# Patient Record
Sex: FEMALE
[De-identification: names, ages, dates, MRNs, and addresses within clinical notes are randomized per-mention and may not be internally consistent; named-entity substitution may affect disease eponyms.]

---

## 2017-06-22 ENCOUNTER — HOSPITAL ENCOUNTER (EMERGENCY)
Dept: HOSPITAL 31 - C.ER | Age: 52
Discharge: HOME | End: 2017-06-22
Payer: COMMERCIAL

## 2017-06-22 VITALS
TEMPERATURE: 98.1 F | HEART RATE: 88 BPM | DIASTOLIC BLOOD PRESSURE: 81 MMHG | SYSTOLIC BLOOD PRESSURE: 112 MMHG | RESPIRATION RATE: 18 BRPM

## 2017-06-22 VITALS — OXYGEN SATURATION: 98 %

## 2017-06-22 DIAGNOSIS — F32.9: Primary | ICD-10-CM

## 2017-06-22 NOTE — C.PDOC
History Of Present Illness


51 y/o female presents to ED with complaints of feeling depressed. Patient 

states for 1 month her  and her were  for infidelity and has 

now developed depressio, "bad thoughts",and loss of appetite. Patient says she 

thought she could overcome the depression by herself as she has overcame 

depression before but she couldn't and came to ED for help. Patient denies 

Suicidal ideation or Homicidal ideation, hallucinations or any other complaints 

at this time. 


Time Seen by Provider: 06/22/17 12:41


Chief Complaint (Nursing): Psychiatric Evaluation


History Per: Patient


History/Exam Limitations: no limitations


Onset/Duration Of Symptoms: Days


Current Symptoms Are (Timing): Still Present


Suicide/Self Injury Attempted (Context): None


Modifying Factor(s): None


Associated Symptoms: Depression





Past Medical History


Reviewed: Historical Data, Nursing Documentation, Vital Signs


Vital Signs: 


 Last Vital Signs











Temp  98.3 F   06/22/17 11:56


 


Pulse  109 H  06/22/17 11:56


 


Resp  20   06/22/17 11:56


 


BP  106/73   06/22/17 11:56


 


Pulse Ox  98   06/22/17 13:26














- Medical History


PMH: Hypercholesterolemia


Family History: States: No Known Family Hx





- Social History


Hx Alcohol Use: No


Hx Substance Use: No





- Immunization History


Hx Tetanus Toxoid Vaccination: No


Hx Influenza Vaccination: No





Review Of Systems


Except As Marked, All Systems Reviewed And Found Negative.


Constitutional: Negative for: Fever


Respiratory: Negative for: Shortness of Breath


Gastrointestinal: Negative for: Nausea, Vomiting, Diarrhea


Neurological: Negative for: Weakness, Headache


Psych: Positive for: Depression.  Negative for: Suicidal ideation





Physical Exam





- Physical Exam


Appears: Non-toxic, Other (Emotional)


Skin: Normal Color, Warm


Head: Atraumatic, Normacephalic


Oral Mucosa: Moist


Extremity: Normal ROM, Capillary Refill (<2 seconds)


Neurological/Psych: Oriented x3, Normal Speech, Other (Depressed )





ED Course And Treatment


O2 Sat by Pulse Oximetry: 98 (RA)


Pulse Ox Interpretation: Normal





Medical Decision Making


Medical Decision Making: 


Psych department called for evaluation on patient








Disposition





- Disposition


Referrals: 


[facility] [Outside]


Disposition: HOME/ ROUTINE


Disposition Time: 14:53


Condition: FAIR


Additional Instructions: 


Ms. Celaya, thank you for letting us take care of you today.





Return to the ER if your symptoms worsen, or if any problems.





Our Mental Health coordinator recommends that you follow up at the [facility].  

Please follow the instructions she gave to you today.


Forms:  Gen Discharge Inst Hungarian, [facility] (Hungarian)


Print Language: Czech





- POA


Present On Arrival: None





- Clinical Impression


Clinical Impression: 


 Major depressive disorder, single episode, unspecified








- Scribe Statement


The provider has reviewed the documentation as recorded by the Norbertibchante Mcghee





All medical record entries made by the Norbertibchante were at my direction and 

personally dictated by me. I have reviewed the chart and agree that the record 

accurately reflects my personal performance of the history, physical exam, 

medical decision making, and the department course for this patient. I have 

also personally directed, reviewed, and agree with the discharge instructions 

and disposition.

## 2018-01-16 ENCOUNTER — HOSPITAL ENCOUNTER (EMERGENCY)
Dept: HOSPITAL 31 - C.ER | Age: 53
Discharge: HOME | End: 2018-01-16
Payer: COMMERCIAL

## 2018-01-16 VITALS
TEMPERATURE: 98.1 F | SYSTOLIC BLOOD PRESSURE: 122 MMHG | HEART RATE: 68 BPM | DIASTOLIC BLOOD PRESSURE: 72 MMHG | RESPIRATION RATE: 20 BRPM

## 2018-01-16 VITALS — OXYGEN SATURATION: 99 %

## 2018-01-16 VITALS — BODY MASS INDEX: 28.3 KG/M2

## 2018-01-16 DIAGNOSIS — E78.00: ICD-10-CM

## 2018-01-16 DIAGNOSIS — K59.00: Primary | ICD-10-CM

## 2018-01-16 LAB
ALBUMIN SERPL-MCNC: 4.1 G/DL (ref 3.5–5)
ALBUMIN/GLOB SERPL: 1.1 {RATIO} (ref 1–2.1)
ALT SERPL-CCNC: 21 U/L (ref 9–52)
AST SERPL-CCNC: 24 U/L (ref 14–36)
BASOPHILS # BLD AUTO: 0.1 K/UL (ref 0–0.2)
BASOPHILS NFR BLD: 0.4 % (ref 0–2)
BILIRUB UR-MCNC: NEGATIVE MG/DL
BNP SERPL-MCNC: 162 PG/ML (ref 0–900)
BUN SERPL-MCNC: 10 MG/DL (ref 7–17)
CALCIUM SERPL-MCNC: 9 MG/DL (ref 8.6–10.4)
EOSINOPHIL # BLD AUTO: 0 K/UL (ref 0–0.7)
EOSINOPHIL NFR BLD: 0 % (ref 0–4)
ERYTHROCYTE [DISTWIDTH] IN BLOOD BY AUTOMATED COUNT: 13.3 % (ref 11.5–14.5)
GFR NON-AFRICAN AMERICAN: > 60
GLUCOSE UR STRIP-MCNC: NORMAL MG/DL
HGB BLD-MCNC: 12.1 G/DL (ref 11–16)
LEUKOCYTE ESTERASE UR-ACNC: (no result) LEU/UL
LIPASE: 110 U/L (ref 23–300)
LYMPHOCYTE: 6 % (ref 20–40)
LYMPHOCYTES # BLD AUTO: 1.5 K/UL (ref 1–4.3)
LYMPHOCYTES NFR BLD AUTO: 8.4 % (ref 20–40)
MCH RBC QN AUTO: 26.6 PG (ref 27–31)
MCHC RBC AUTO-ENTMCNC: 33.6 G/DL (ref 33–37)
MCV RBC AUTO: 79.4 FL (ref 81–99)
MONOCYTE: 2 % (ref 0–10)
MONOCYTES # BLD: 0.8 K/UL (ref 0–0.8)
MONOCYTES NFR BLD: 4.4 % (ref 0–10)
NEUTROPHILS # BLD: 15.7 K/UL (ref 1.8–7)
NEUTROPHILS NFR BLD AUTO: 86.8 % (ref 50–75)
NEUTROPHILS NFR BLD AUTO: 90 % (ref 50–75)
NEUTS BAND NFR BLD: 2 % (ref 0–2)
NRBC BLD AUTO-RTO: 0 % (ref 0–2)
PH UR STRIP: 8 [PH] (ref 5–8)
PLATELET # BLD EST: (no result) 10*3/UL
PLATELET # BLD: 447 K/UL (ref 130–400)
PMV BLD AUTO: 8.2 FL (ref 7.2–11.7)
PROT UR STRIP-MCNC: (no result) MG/DL
RBC # BLD AUTO: 4.54 MIL/UL (ref 3.8–5.2)
RBC # UR STRIP: NEGATIVE /UL
SP GR UR STRIP: 1.03 (ref 1–1.03)
SQUAMOUS EPITHIAL: 6 /HPF (ref 0–5)
STOMATOCYTES BLD QL SMEAR: SLIGHT
TOTAL CELLS COUNTED BLD: 100
URINE NITRATE: NEGATIVE
UROBILINOGEN UR-MCNC: NORMAL MG/DL (ref 0.2–1)
WBC # BLD AUTO: 18.1 K/UL (ref 4.8–10.8)

## 2018-01-16 PROCEDURE — 85025 COMPLETE CBC W/AUTO DIFF WBC: CPT

## 2018-01-16 PROCEDURE — 74176 CT ABD & PELVIS W/O CONTRAST: CPT

## 2018-01-16 PROCEDURE — 99284 EMERGENCY DEPT VISIT MOD MDM: CPT

## 2018-01-16 PROCEDURE — 74022 RADEX COMPL AQT ABD SERIES: CPT

## 2018-01-16 PROCEDURE — 83880 ASSAY OF NATRIURETIC PEPTIDE: CPT

## 2018-01-16 PROCEDURE — 96374 THER/PROPH/DIAG INJ IV PUSH: CPT

## 2018-01-16 PROCEDURE — 83690 ASSAY OF LIPASE: CPT

## 2018-01-16 PROCEDURE — 80053 COMPREHEN METABOLIC PANEL: CPT

## 2018-01-16 PROCEDURE — 81001 URINALYSIS AUTO W/SCOPE: CPT

## 2018-01-16 PROCEDURE — 84484 ASSAY OF TROPONIN QUANT: CPT

## 2018-01-16 PROCEDURE — 96375 TX/PRO/DX INJ NEW DRUG ADDON: CPT

## 2018-01-16 PROCEDURE — 96361 HYDRATE IV INFUSION ADD-ON: CPT

## 2018-01-16 NOTE — RAD
PROCEDURE:  Radiographs of the chest and abdomen (obstructive series)



HISTORY:

Abdominal pain



COMPARISON:

No prior.



TECHNIQUE:

AP radiograph of the chest, with upright and supine radiographs of 

the abdomen.



FINDINGS:



CHEST:

Lungs: The lungs are clear.



Cardiovascular: Normal size heart. No pulmonary vascular congestion.



Pleura: No pleural fluid. No pneumothorax.



Other findings: None.



ABDOMEN AND PELVIS:

Bowel: There is moderate amount of stool in the ascending and 

proximal transverse colon. No evidence of mechanical obstruction.



Free air: None.



Bones:  There is levoscoliosis in the lumbar spine.



Other findings: None.



IMPRESSION:

Moderate stool burden in the ascending and proximal transverse colon. 

No evidence of bowel obstruction.



Clear lungs.

## 2018-01-16 NOTE — CT
PROCEDURE:  CT Abdomen and Pelvis without intravenous contrast



HISTORY:

abd pain



COMPARISON:

None.



TECHNIQUE:

Technique. 



Contrast Dose: None



Radiation dose:



Total exam DLP = 1045 mGy-cm.



This CT exam was performed using one or more of the following dose 

reduction techniques: Automated exposure control, adjustment of the 

mA and/or kV according to patient size, and/or use of iterative 

reconstruction technique.



FINDINGS:



LOWER THORAX:

Left basal calcified granuloma. Partially visualized coronary artery 

calcifications 



LIVER:

Unremarkable. No gross lesion or ductal dilatation.  



GALLBLADDER AND BILE DUCTS:

Distended gallbladder. No gross gallstones. Common bile duct a 

pancreatic head appears prominent. 



PANCREAS:

Common bile duct prominence - pancreatic head.



SPLEEN:

Unremarkable. 



ADRENALS:

Unremarkable. No mass. 



KIDNEYS AND URETERS:

5 to 6 mm left lower renal pole calculus nonobstructing. No 

hydronephrosis. No gross masses noted on this noncontrast study. 



VASCULATURE:

Short segmental atherosclerotic vascular ossifications -descending 

abdominal aorta. . No aortic aneurysm. 



BOWEL:

Unremarkable. No obstruction. No gross mural thickening. 



APPENDIX:

Unremarkable. Normal appendix. 



PERITONEUM:

Unremarkable. No free fluid. No free air. 



LYMPH NODES:

Unremarkable. No enlarged lymph nodes. 



BLADDER:

Unremarkable. 



REPRODUCTIVE:

Unremarkable. 



BONES:

Developmental variation -inferior lumbar block vertebrae -sacral 

cysts digested 



OTHER FINDINGS:

None.



IMPRESSION:

Left lower renal pole nonobstructing 5 to 6 mm calculus. No 

hydronephrosis no hydroureter 



Distended gallbladder. Common bile duct prominence pancreatic head. 

Evaluation regarding any potential pancreatic masses -no gross 

appreciated. No gross calculi in common bile duct appreciated. 

Follow-up recommended -initial right upper quadrant ultrasound 

attention to gallbladder common bile duct and pancreatic head 

recommended. Additional CT pancreatic protocol subsequent imaging -to 

be determined

## 2018-01-16 NOTE — C.PDOC
History Of Present Illness


52 year old female with PMHx of lumbar scoliosis presents to the ED accompanied 

by her daughter for evaluation of abdominal pain that started today at 

approximately at 03:00 am. Patient states having multiple episodes of vomiting 

associated with chills, subjective fever. Patient denies back pain, weakness, 

numbness, diarrhea, constipation, cough, chills, fever. 


Time Seen by Provider: 01/16/18 12:12


Chief Complaint (Nursing): Abdominal Pain


History Per: Patient, Family


History/Exam Limitations: no limitations


Onset/Duration Of Symptoms: Hrs


Current Symptoms Are (Timing): Still Present


Severity: Moderate


Location Of Pain/Discomfort: Diffuse


Radiation Of Pain To:: None


Quality Of Discomfort: Other (Colic)


Associated Symptoms: Vomiting.  denies: Fever, Diarrhea, Back Pain, Constipation


Exacerbating Factors: None


Alleviating Factors: None


Recent travel outside of the [place]: No


Additional History Per: Patient


Abnormal Vaginal Bleeding: No





Past Medical History


Reviewed: Historical Data, Nursing Documentation, Vital Signs


Vital Signs: 


 Last Vital Signs











Temp  98.1 F   01/16/18 15:09


 


Pulse  68   01/16/18 15:09


 


Resp  20   01/16/18 15:09


 


BP  122/72   01/16/18 15:09


 


Pulse Ox  99   01/16/18 15:42














- Medical History


PMH: Hypercholesterolemia


   Denies: Diabetes, Hepatitis, HIV, HTN, Seizures, Sexually Transmitted Disease


Surgical History: No Surg Hx


Family History: States: Unknown Family Hx





- Social History


Hx Alcohol Use: No


Hx Substance Use: No





- Immunization History


Hx Tetanus Toxoid Vaccination: No


Hx Influenza Vaccination: No





Review Of Systems


Constitutional: Negative for: Fever, Chills


Respiratory: Negative for: Cough, Shortness of Breath


Gastrointestinal: Positive for: Vomiting, Abdominal Pain.  Negative for: Nausea

, Diarrhea, Constipation


Genitourinary: Negative for: Dysuria, Hematuria


Skin: Negative for: Rash


Neurological: Negative for: Weakness, Numbness, Headache





Physical Exam





- Physical Exam


Appears: Non-toxic, In Acute Distress


Skin: Normal Color, Warm, Dry


Head: Atraumatic, Normacephalic


Eye(s): bilateral: Normal Inspection


Nose: No Discharge, No Deformity


Oral Mucosa: Moist


Neck: Normal ROM, Supple


Chest: Symmetrical


Cardiovascular: Rhythm Regular, No Murmur


Respiratory: Normal Breath Sounds, No Rales, No Rhonchi, No Wheezing


Gastrointestinal/Abdominal: Soft, Tenderness, Guarding, No Rebound


Extremity: Normal ROM, No Pedal Edema, No Calf Tenderness, No Deformity, No 

Swelling


Neurological/Psych: Oriented x3, Normal Speech, Normal Cognition





ED Course And Treatment





- Laboratory Results


Result Diagrams: 


 01/16/18 12:36





 01/16/18 12:36


O2 Sat by Pulse Oximetry: 99 (On RA)


Pulse Ox Interpretation: Normal





- Other Rad


  ** Obstructive series X-Ray


X-Ray: Viewed By Me, Read By Radiologist


Interpretation: PROCEDURE:  Radiographs of the chest and abdomen (obstructive 

series).  HISTORY:  Abdominal pain.  COMPARISON:  No prior.  TECHNIQUE:  AP 

radiograph of the chest, with upright and supine radiographs of the abdomen.  

FINDINGS:  CHEST:  Lungs: The lungs are clear.  Cardiovascular: Normal size 

heart. No pulmonary vascular congestion.  Pleura: No pleural fluid. No 

pneumothorax.  Other findings: None.  ABDOMEN AND PELVIS:  Bowel: There is 

moderate amount of stool in the ascending and proximal transverse colon. No 

evidence of mechanical obstruction.  Free air: None.  Bones:  There is 

levoscoliosis in the lumbar spine.  Other findings: None.  IMPRESSION:  

Moderate stool burden in the ascending and proximal transverse colon. No 

evidence of bowel obstruction.  Clear lungs.





- CT Scan/US


  ** Abd/pelvis CT


Other Rad Studies (CT/US): Read By Radiologist, Radiology Report Reviewed


CT/US Interpretation: PROCEDURE:  CT Abdomen and Pelvis without intravenous 

contrast.  HISTORY:  abd pain.  COMPARISON:  None.  TECHNIQUE:  Technique.  

Contrast Dose: None.  Radiation dose:  Total exam DLP = 1045 mGy-cm.  This CT 

exam was performed using one or more of the following dose reduction techniques

: Automated exposure control, adjustment of the mA and/or kV according to 

patient size, and/or use of iterative reconstruction technique.  FINDINGS:  

LOWER THORAX:  Left basal calcified granuloma. Partially visualized coronary 

artery calcifications.  LIVER:  Unremarkable. No gross lesion or ductal 

dilatation.  GALLBLADDER AND BILE DUCTS:  Distended gallbladder. No gross 

gallstones. Common bile duct a pancreatic head appears prominent.  PANCREAS:  

Common bile duct prominence - pancreatic head.  SPLEEN:  Unremarkable.  ADRENALS

:  Unremarkable. No mass.  KIDNEYS AND URETERS:  5 to 6 mm left lower renal 

pole calculus nonobstructing. No hydronephrosis. No gross masses noted on this 

noncontrast study.  VASCULATURE:  Short segmental atherosclerotic vascular 

ossifications -descending abdominal aorta. . No aortic aneurysm.  BOWEL:  

Unremarkable. No obstruction. No gross mural thickening.  APPENDIX:  

Unremarkable. Normal appendix.  PERITONEUM:  Unremarkable. No free fluid. No 

free air.  LYMPH NODES:  Unremarkable. No enlarged lymph nodes.  BLADDER:  

Unremarkable.  REPRODUCTIVE:  Unremarkable.  BONES:  Developmental variation -

inferior lumbar block vertebrae -sacral cysts digested.  OTHER FINDINGS:  None.

  IMPRESSION:  Left lower renal pole nonobstructing 5 to 6 mm calculus. No 

hydronephrosis no hydroureter.  Distended gallbladder. Common bile duct 

prominence pancreatic head. Evaluation regarding any potential pancreatic 

masses -no gross appreciated. No gross calculi in common bile duct appreciated. 

Follow-up recommended -initial right upper quadrant ultrasound attention to 

gallbladder common bile duct and pancreatic head recommended. Additional CT 

pancreatic protocol subsequent imaging -to be determined





Medical Decision Making


Medical Decision Making: 


Impression : Abdominal pain


Plan:


* CT abd/pelvis


* Labs


* Obstructive series X-ray


* UA


* Donnatal 1 yab PO


* Maalox 30 ml PO


* Morphine 2 mg IVP


* Pepcid 20 mg IVP


* IV fluids











Disposition


Counseled Patient/Family Regarding: Studies Performed, Need For Followup, Rx 

Given





- Disposition


Disposition: HOME/ ROUTINE


Disposition Time: 15:14


Condition: STABLE


Prescriptions: 


Docusate Sodium [Colace] 100 mg PO BID #20 capsule


Phosphate Enema [Fleet Enema 135 Ml] 135 ml RC DAILY #2 nma


Psyllium Husk/Aspartame [Metamucil Fiber Singles Packet] 3.4 gm PO DAILY #10 

powd.pack


Instructions:  Constipation (ED)


Forms:  [facility] (Singaporean), Gen Discharge Inst Singaporean





- POA


Present On Arrival: None





- Clinical Impression


Clinical Impression: 


 Constipation








- Scribe Statement


The provider has reviewed the documentation as recorded by the Scribe





Zhen Matos





All medical record entries made by the Scribe were at my direction and 

personally dictated by me. I have reviewed the chart and agree that the record 

accurately reflects my personal performance of the history, physical exam, 

medical decision making, and the department course for this patient. I have 

also personally directed, reviewed, and agree with the discharge instructions 

and disposition.

## 2018-01-22 ENCOUNTER — HOSPITAL ENCOUNTER (INPATIENT)
Dept: HOSPITAL 31 - C.ER | Age: 53
LOS: 5 days | Discharge: HOME | DRG: 493 | End: 2018-01-27
Attending: INTERNAL MEDICINE | Admitting: INTERNAL MEDICINE
Payer: COMMERCIAL

## 2018-01-22 VITALS — BODY MASS INDEX: 28.3 KG/M2

## 2018-01-22 DIAGNOSIS — K80.00: Primary | ICD-10-CM

## 2018-01-22 DIAGNOSIS — M41.9: ICD-10-CM

## 2018-01-22 DIAGNOSIS — F41.9: ICD-10-CM

## 2018-01-22 DIAGNOSIS — F32.9: ICD-10-CM

## 2018-01-22 DIAGNOSIS — E78.00: ICD-10-CM

## 2018-01-22 DIAGNOSIS — Z79.899: ICD-10-CM

## 2018-01-22 DIAGNOSIS — N39.0: ICD-10-CM

## 2018-01-22 LAB
ALBUMIN SERPL-MCNC: 4.2 G/DL (ref 3.5–5)
ALBUMIN/GLOB SERPL: 1 {RATIO} (ref 1–2.1)
ALT SERPL-CCNC: 23 U/L (ref 9–52)
APTT BLD: 32 SECONDS (ref 21–34)
AST SERPL-CCNC: 16 U/L (ref 14–36)
BACTERIA #/AREA URNS HPF: (no result) /[HPF]
BASOPHILS # BLD AUTO: 0.1 K/UL (ref 0–0.2)
BASOPHILS NFR BLD: 0.8 % (ref 0–2)
BILIRUB DIRECT SERPL-MCNC: 0.3 MG/DL (ref 0–0.4)
BILIRUB UR-MCNC: NEGATIVE MG/DL
BUN SERPL-MCNC: 8 MG/DL (ref 7–17)
CALCIUM SERPL-MCNC: 8.9 MG/DL (ref 8.6–10.4)
EOSINOPHIL # BLD AUTO: 0.2 K/UL (ref 0–0.7)
EOSINOPHIL NFR BLD: 1.5 % (ref 0–4)
ERYTHROCYTE [DISTWIDTH] IN BLOOD BY AUTOMATED COUNT: 13 % (ref 11.5–14.5)
GFR NON-AFRICAN AMERICAN: > 60
GLUCOSE UR STRIP-MCNC: NORMAL MG/DL
HCG,QUALITATIVE URINE: NEGATIVE
HGB BLD-MCNC: 11.7 G/DL (ref 11–16)
INR PPP: 1.2
LEUKOCYTE ESTERASE UR-ACNC: (no result) LEU/UL
LIPASE: 110 U/L (ref 23–300)
LYMPHOCYTES # BLD AUTO: 3.2 K/UL (ref 1–4.3)
LYMPHOCYTES NFR BLD AUTO: 25.2 % (ref 20–40)
MCH RBC QN AUTO: 26.6 PG (ref 27–31)
MCHC RBC AUTO-ENTMCNC: 33.1 G/DL (ref 33–37)
MCV RBC AUTO: 80.4 FL (ref 81–99)
MONOCYTES # BLD: 0.9 K/UL (ref 0–0.8)
MONOCYTES NFR BLD: 7.4 % (ref 0–10)
NEUTROPHILS # BLD: 8.3 K/UL (ref 1.8–7)
NEUTROPHILS NFR BLD AUTO: 65.1 % (ref 50–75)
NRBC BLD AUTO-RTO: 0 % (ref 0–2)
PH UR STRIP: 8 [PH] (ref 5–8)
PLATELET # BLD: 534 K/UL (ref 130–400)
PMV BLD AUTO: 7.5 FL (ref 7.2–11.7)
PROT UR STRIP-MCNC: NEGATIVE MG/DL
PROTHROMBIN TIME: 13.1 SECONDS (ref 9.7–12.2)
RBC # BLD AUTO: 4.39 MIL/UL (ref 3.8–5.2)
RBC # UR STRIP: NEGATIVE /UL
SP GR UR STRIP: 1.01 (ref 1–1.03)
SQUAMOUS EPITHIAL: 10 /HPF (ref 0–5)
URINE NITRATE: POSITIVE
UROBILINOGEN UR-MCNC: NORMAL MG/DL (ref 0.2–1)
WBC # BLD AUTO: 12.8 K/UL (ref 4.8–10.8)

## 2018-01-22 NOTE — US
HISTORY:

abd pain



COMPARISON:

CT abdomen and pelvis without contrast performed 1/16/18



TECHNIQUE:

Sonographic evaluation of the abdomen.



FINDINGS:



LIVER:

Measures 13.2 cm in sagittal dimension and appears unremarkable.  No 

focal hepatic mass identified. The main portal vein appears patent 

with normal directional flow.   No intrahepatic bile duct dilatation.



GALLBLADDER:

Immobile gallstone in the gallbladder neck. No gallbladder wall 

thickening. Positive sonographic Alfonso's sign as assessed by the 

sonographer.



COMMON BILE DUCT:

Measures 6 mm. 



PANCREAS:

Not well visualized.



RIGHT KIDNEY:

Measures 11.2 x 4.7 x 4.3cm.  No obstructing calculus or 

hydronephrosis identified.



LEFT KIDNEY:

Measures lump which should by 5.8 x 5.4cm. No obstructing calculus or 

hydronephrosis identified. Caps 7 mm nonobstructing calculus. 



SPLEEN:

Measures approximately 9.0 cm 



AORTA:

Limited views appear unremarkable. 



IVC:

Limited views appear unremarkable. 



OTHER FINDINGS:

None. 



IMPRESSION:

Evidence of immobile gallstone at the gallbladder neck. Positive 

sonographic Alfonso's sign as assessed by the sonographer.  No 

evidence of gallbladder wall thickening or pericholecystic edema.  

Correlate clinically for possibility of cholecystitis.



Nonobstructing 7 mm left renal calculus.

## 2018-01-22 NOTE — C.PDOC
History Of Present Illness


53 y/o female, sent by Dr. Kang, presents to the ER for right upper 

quadrant abdominal pain. Patient states that she was recently seen here and and 

she was discharged with CT which showed a distended galbladder. Patient denies 

having any fever, nausea, and vomiting.


Time Seen by Provider: 01/22/18 15:29


Chief Complaint (Nursing): Abdominal Pain


History Per: Patient


History/Exam Limitations: no limitations


Onset/Duration Of Symptoms: Days


Current Symptoms Are (Timing): Still Present


Severity: Moderate





Past Medical History


Reviewed: Historical Data, Nursing Documentation, Vital Signs


Vital Signs: 


 Last Vital Signs











Temp  98.7 F   01/22/18 14:01


 


Pulse  83   01/22/18 14:01


 


Resp  18   01/22/18 14:01


 


BP  109/72   01/22/18 14:01


 


Pulse Ox  99   01/22/18 16:23














- Medical History


PMH: Hypercholesterolemia


   Denies: Diabetes, Hepatitis, HIV, HTN, Seizures, Sexually Transmitted Disease


Surgical History: No Surg Hx


Family History: States: No Known Family Hx





- Social History


Hx Alcohol Use: No


Hx Substance Use: No





- Immunization History


Hx Tetanus Toxoid Vaccination: No


Hx Influenza Vaccination: No





Review Of Systems


Except As Marked, All Systems Reviewed And Found Negative.


Constitutional: Negative for: Fever, Chills


Gastrointestinal: Positive for: Abdominal Pain.  Negative for: Nausea, Vomiting





Physical Exam





- Physical Exam


Appears: Non-toxic, No Acute Distress


Skin: Normal Color, Warm


Head: Atraumatic, Normacephalic


Eye(s): bilateral: Normal Inspection, PERRL


Nose: Normal


Oral Mucosa: Moist


Neck: Supple


Chest: Symmetrical


Cardiovascular: Rhythm Regular


Respiratory: Normal Breath Sounds, No Accessory Muscle Use, No Rales, No Rhonchi

, No Wheezing


Gastrointestinal/Abdominal: Normal Exam, Soft, Tenderness (RUQ tenderness), No 

Guarding, No Rebound


Extremity: Normal ROM


Neurological/Psych: Oriented x3, Normal Speech, Normal Cognition, Normal Motor, 

Normal Sensation





ED Course And Treatment





- Laboratory Results


Result Diagrams: 


 01/22/18 15:54





 01/22/18 15:54


O2 Sat by Pulse Oximetry: 99 (RA)


Pulse Ox Interpretation: Normal





Medical Decision Making


Medical Decision Making: 


Impression:


RUQ Abdominal Pain-suspect cholecystitis - 


Plan:


--Labs


--Urinalysis


--US-Abdomen





pt reassesed: pt with us shows possibl echole. antibiotics ordered, dr vang 

accepts surgical resident bedside request or tommorow. 





Disposition





- Disposition


Disposition: HOSPITALIZED


Disposition Time: 18:29


Condition: STABLE





- Clinical Impression


Clinical Impression: 


 Cholecystitis, UTI (urinary tract infection)








- Scribe Statement


The provider has reviewed the documentation as recorded by the Scribe


Jonathan Jimmie


Provider Attestation: 





All medical record entries made by the Scribe were at my direction and 

personally dictated by me. I have reviewed the chart and agree that the record 

accurately reflects my personal performance of the history, physical exam, 

medical decision making, and the department course for this patient. I have 

also personally directed, reviewed, and agree with the discharge instructions 

and disposition.





Decision To Admit





- Pt Status Changed To:


Hospital Disposition Of: Inpatient





- Admit Certification


Admit to Inpatient:: After my assessment, the patient will require 

hospitalization for at least two midnights.  This is because of the severity of 

symptoms shown, intensity of services needed, and/or the medical risk in this 

patient being treated as an outpatient.





- InPatient:


Physician Admission Certification:: needs iv anbitiocsi or for nova





- .


Bed Request Type: Regular


Admitting Physician: Tolu Vang Jr.


Patient Diagnosis: 


 Cholecystitis, UTI (urinary tract infection)

## 2018-01-22 NOTE — CP.PCM.CON
History of Present Illness





- History of Present Illness


History of Present Illness: 


Surgery consult.





The daughter is present for the conversation. 


Patient is a 52 y.o.  female ho cholelithiasis  who presents to the ED 

with RUQ pain. She states that it began 7 days ago, and at that time, she was 

vomiting yellow-green fluid, with no blood. She was admitted to [facility] 

and worked up for hardened stool causing obstruction. she was given morphine 

and stool softeners and discharged. Since then, she has had increasing RUQ pain 

and admitted herself to the ED tonight. She states that eating makes the pain 

worse and describes her pain as dull and achy. She admits to [place], with no issues after coming back. She denies any 

sick contacts. Patient admits to lethargy, headache, mild palpitations, mild 

dyspnea, naseua and constipation. She also has TTP on the RUQ of the abdomen. 

Imaging shows a thickened gallbladder wall and gallstones present.





PMH:Hypercholesteremia, back pain, MDD and Anxiety


PSH: in  and 


Hosp:Left breast cyst FNA in , self-admitted herself to [facility] last 


Social hx:Denies alcohol and tabacoo. Lives with 2 daughters


Medications:Naproxen 500 mg q2d, tylenol 500 mg q2d, Trazadone 100 mg qd, 

Buspirone 50 mg qd


Family hx:History of gallstones, diabetes


Allergies: none








Review of Systems





- Constitutional


Constitutional: As Per HPI, Fever, Headache, Weakness.  absent: Chills





- Cardiovascular


Cardiovascular: As Per HPI, Dyspnea.  absent: Chest Pain, Lightheadedness, 

Palpitations





- Respiratory


Respiratory: As Per HPI.  absent: Cough, Dyspnea





- Gastrointestinal


Gastrointestinal: As Per HPI, Abdominal Pain, Constipation, Nausea, Vomiting.  

absent: Diarrhea





- Genitourinary


Genitourinary: As Per HPI.  absent: Difficulty Urinating, Dysuria





Past Patient History





- Past Social History


Smoking Status: Never Smoked





- CARDIAC


Hx Hypercholesterolemia: Yes


Hx Hypertension: No





- PULMONARY


Hx Tuberculosis: No





- NEUROLOGICAL


Hx Seizures: No





- HEMATOLOGICAL/ONCOLOGICAL


Hx Human Immunodeficiency Virus (HIV): No





- MUSCULOSKELETAL/RHEUMATOLOGICAL


Hx Back Pain: Yes





- GENITOURINARY/GYNECOLOGICAL


Hx Sexually Transmitted Disorders: No





- PSYCHIATRIC


Hx Substance Use: No





- SURGICAL HISTORY


Hx  Section: Yes (x 2)





- ANESTHESIA


Hx Anesthesia: Yes


Hx Anesthesia Reactions: No





Meds


Allergies/Adverse Reactions: 


 Allergies











Allergy/AdvReac Type Severity Reaction Status Date / Time


 


No Known Allergies Allergy   Verified 18 11:32














Physical Exam





- Head Exam


Head Exam: NORMAL INSPECTION





- Eye Exam


Eye Exam: Normal appearance





- Respiratory Exam


Respiratory Exam: NORMAL BREATHING PATTERN





- Cardiovascular Exam


Cardiovascular Exam: REGULAR RHYTHM, RRR, +S1, +S2





- GI/Abdominal Exam


GI & Abdominal Exam: Normal Bowel Sounds, Tenderness.  absent: Distended, Firm, 

Guarding, Rebound, Rigid


Additional comments: 





TTP in RUQ. illicits pain 





Results





- Vital Signs


Recent Vital Signs: 


 Last Vital Signs











Temp  98.4 F   18 18:51


 


Pulse  95 H  18 18:51


 


Resp  18   18 18:51


 


BP  111/68   18 18:51


 


Pulse Ox  98   18 18:51














- Labs


Result Diagrams: 


 18 15:54





 18 15:54


Labs: 


 Laboratory Results - last 24 hr











  18





  15:54 15:54 15:54


 


WBC  12.8 H  


 


RBC  4.39  


 


Hgb  11.7  


 


Hct  35.3  


 


MCV  80.4 L  


 


MCH  26.6 L  


 


MCHC  33.1  


 


RDW  13.0  


 


Plt Count  534 H  


 


MPV  7.5  


 


Neut % (Auto)  65.1  


 


Lymph % (Auto)  25.2  


 


Mono % (Auto)  7.4  


 


Eos % (Auto)  1.5  


 


Baso % (Auto)  0.8  


 


Neut #  8.3 H  


 


Lymph #  3.2  


 


Mono #  0.9 H  


 


Eos #  0.2  


 


Baso #  0.1  


 


PT   13.1 H 


 


INR   1.2 


 


APTT   32 


 


Sodium   


 


Potassium   


 


Chloride   


 


Carbon Dioxide   


 


Anion Gap   


 


BUN   


 


Creatinine   


 


Est GFR ( Amer)   


 


Est GFR (Non-Af Amer)   


 


Random Glucose   


 


Calcium   


 


Total Bilirubin   


 


Direct Bilirubin   


 


AST   


 


ALT   


 


Alkaline Phosphatase   


 


Total Protein   


 


Albumin   


 


Globulin   


 


Albumin/Globulin Ratio   


 


Lipase   


 


Urine Color    Yellow


 


Urine Clarity    Hazy


 


Urine pH    8.0


 


Ur Specific Gravity    1.014


 


Urine Protein    Negative


 


Urine Glucose (UA)    Normal


 


Urine Ketones    Trace


 


Urine Blood    Negative


 


Urine Nitrate    Positive H


 


Urine Bilirubin    Negative


 


Urine Urobilinogen    Normal


 


Ur Leukocyte Esterase    Neg


 


Urine WBC (Auto)    1


 


Urine RBC (Auto)    3


 


Ur Squamous Epith Cells    10 H


 


Urine Bacteria    Many H


 


Urine HCG, Qual    Negative














  18





  15:54


 


WBC 


 


RBC 


 


Hgb 


 


Hct 


 


MCV 


 


MCH 


 


MCHC 


 


RDW 


 


Plt Count 


 


MPV 


 


Neut % (Auto) 


 


Lymph % (Auto) 


 


Mono % (Auto) 


 


Eos % (Auto) 


 


Baso % (Auto) 


 


Neut # 


 


Lymph # 


 


Mono # 


 


Eos # 


 


Baso # 


 


PT 


 


INR 


 


APTT 


 


Sodium  131 L


 


Potassium  3.8


 


Chloride  95 L


 


Carbon Dioxide  26


 


Anion Gap  14


 


BUN  8


 


Creatinine  0.5 L


 


Est GFR ( Amer)  > 60


 


Est GFR (Non-Af Amer)  > 60


 


Random Glucose  104


 


Calcium  8.9


 


Total Bilirubin  0.5


 


Direct Bilirubin  0.3


 


AST  16


 


ALT  23


 


Alkaline Phosphatase  76


 


Total Protein  8.5 H


 


Albumin  4.2


 


Globulin  4.3 H


 


Albumin/Globulin Ratio  1.0


 


Lipase  110


 


Urine Color 


 


Urine Clarity 


 


Urine pH 


 


Ur Specific Gravity 


 


Urine Protein 


 


Urine Glucose (UA) 


 


Urine Ketones 


 


Urine Blood 


 


Urine Nitrate 


 


Urine Bilirubin 


 


Urine Urobilinogen 


 


Ur Leukocyte Esterase 


 


Urine WBC (Auto) 


 


Urine RBC (Auto) 


 


Ur Squamous Epith Cells 


 


Urine Bacteria 


 


Urine HCG, Qual 














Assessment & Plan





- Assessment and Plan (Free Text)


Assessment: 





Symptomatic cholelithiasis vs acute cholecystitis 


Plan: 


OR tomorrow





NPO past midnight


IV fluids


Dilaudid for pain


Ondansetron for n/v


Tylenol for fever


Ceftriaxone for abx prophylaxis





DW with Dr. Cristina

## 2018-01-22 NOTE — CP.PCM.HP
History of Present Illness





- History of Present Illness


History of Present Illness: 





H&P for Dr. Topete's Service:





CC: " My upper belly hurts"





HPI:


Patient is a 52 year old  female, with PMHx of cholelithiasis, anxiety, 

depression, DDD, and scoliosis, who presents to [facility] [place] ED c/o RUQ pain. Patient 

states that pain began one week ago, starting in her epigastric area than 

spreading to her RUQ. At that time, she vomited yellow-green fluid "three to 

four times with no blood." When the pain did not cease she came to [facility] [place] ED on 

18. Pt reports having CT which showed "distended gallbladder" and was 

diagnosed with constipation, given morphine and stool softeners, and 

discharged. Since discharge, pt has had worsening non-radiating RUQ pain, "10/10

" in severity, that skchante describes as a "twisting sensation" that "comes and 

goes." Pain is made worse with eating, especially fatty foods, but now all 

foods are intolerable. Patient denies any sick contacts. Patient denies having 

any fever, nausea, and vomiting.





PMH: Hypercholesteremia, back pain, MDD and Anxiety


PSH:  in  and , Left breast cyst fluid extraction 


Social hx:Denies alcohol and tobacoo. Lives with 2 daughters. Unemployed.


Medications:Naproxen 500 mg q2d, tylenol 500 mg q2d, Trazadone 100 mg qd, 

Buspirone 50 mg qd


Family hx:History of gallstones, diabetes


Allergies: none





Present on Admission





- Present on Admission


Any Indicators Present on Admission: No


History of DVT/PE: No


History of Uncontrolled Diabetes: No





Review of Systems





- Constitutional


Constitutional: absent: Chills, Fever





- EENT


Eyes: absent: Change in Vision


Ears: absent: Ear Discharge, Ear Pain


Nose/Mouth/Throat: absent: Nasal Congestion, Dry Mouth, Sore Throat





- Cardiovascular


Cardiovascular: absent: Chest Pain, Dyspnea, Leg Edema





- Respiratory


Respiratory: absent: Cough, Dyspnea





- Gastrointestinal


Gastrointestinal: Abdominal Pain (RUQ), Bloating, Constipation.  absent: 

Diarrhea, Heartburn, Nausea, Vomiting





- Genitourinary


Genitourinary: absent: Dysuria





- Musculoskeletal


Musculoskeletal: absent: Back Pain, Numbness, Tingling





- Integumentary


Integumentary: absent: Dry Skin





- Neurological


Neurological: absent: Tingling, Weakness





- Psychiatric


Psychiatric: Anxiety, Depression





- Endocrine


Endocrine: Fatigue.  absent: Palpitations





Past Patient History





- Past Social History


Smoking Status: Never Smoked





- CARDIAC


Hx Hypercholesterolemia: No


Hx Hypertension: No





- PULMONARY


Hx Tuberculosis: No





- NEUROLOGICAL


Hx Seizures: No





- HEMATOLOGICAL/ONCOLOGICAL


Hx Human Immunodeficiency Virus (HIV): No





- MUSCULOSKELETAL/RHEUMATOLOGICAL


Hx Arthritis: Yes


Hx Back Pain: Yes (Lumbar Scoliosis)


Hx Falls: Yes (Oct/17)


Hx Herniated Disk: Yes





- GENITOURINARY/GYNECOLOGICAL


Hx Sexually Transmitted Disorders: No





- PSYCHIATRIC


Hx Anxiety: Yes


Hx Depression: Yes (pt is on medication)


Hx Substance Use: No





- SURGICAL HISTORY


Hx  Section: Yes (x 2)


Other/Comment: Left breast cyst fluid extraction 





- ANESTHESIA


Hx Anesthesia: Yes


Hx Anesthesia Reactions: No


Hx Malignant Hyperthermia: No


Has any member of the family had a problem w/ anesthesia?: No





Meds


Allergies/Adverse Reactions: 


 Allergies











Allergy/AdvReac Type Severity Reaction Status Date / Time


 


No Known Allergies Allergy   Verified 18 11:32














Physical Exam





- Constitutional


Appears: Non-toxic, No Acute Distress





- Head Exam


Head Exam: ATRAUMATIC, NORMAL INSPECTION





- Eye Exam


Eye Exam: EOMI, Normal appearance





- ENT Exam


ENT Exam: Mucous Membranes Moist





- Respiratory Exam


Respiratory Exam: Clear to Auscultation Bilateral, NORMAL BREATHING PATTERN.  

absent: Rales, Rhonchi, Wheezes





- Cardiovascular Exam


Cardiovascular Exam: REGULAR RHYTHM, +S1, +S2





- GI/Abdominal Exam


GI & Abdominal Exam: Normal Bowel Sounds, Soft, Tenderness (RUQ pain).  absent: 

Guarding, Organomegaly, Rebound


Additional comments: 





rain sign positive





- Extremities Exam


Extremities exam: Positive for: normal inspection.  Negative for: pedal edema





- Back Exam


Back exam: absent: CVA tenderness (L), CVA tenderness (R)





- Neurological Exam


Neurological exam: Alert, CN II-XII Intact, Oriented x3





- Psychiatric Exam


Psychiatric exam: Normal Affect, Normal Mood





- Skin


Skin Exam: Dry, Normal Color, Warm





Results





- Vital Signs


Recent Vital Signs: 





 Last Vital Signs











Temp  97.5 F L  18 19:37


 


Pulse  74   18 19:37


 


Resp  20   18 19:37


 


BP  93/58 L  01/22/18 19:37


 


Pulse Ox  99   18 19:37














- Labs


Result Diagrams: 


 18 15:54





 18 15:54


Labs: 





 Laboratory Results - last 24 hr











  18





  15:54 15:54 15:54


 


WBC  12.8 H  


 


RBC  4.39  


 


Hgb  11.7  


 


Hct  35.3  


 


MCV  80.4 L  


 


MCH  26.6 L  


 


MCHC  33.1  


 


RDW  13.0  


 


Plt Count  534 H  


 


MPV  7.5  


 


Neut % (Auto)  65.1  


 


Lymph % (Auto)  25.2  


 


Mono % (Auto)  7.4  


 


Eos % (Auto)  1.5  


 


Baso % (Auto)  0.8  


 


Neut #  8.3 H  


 


Lymph #  3.2  


 


Mono #  0.9 H  


 


Eos #  0.2  


 


Baso #  0.1  


 


PT   13.1 H 


 


INR   1.2 


 


APTT   32 


 


Sodium   


 


Potassium   


 


Chloride   


 


Carbon Dioxide   


 


Anion Gap   


 


BUN   


 


Creatinine   


 


Est GFR ( Amer)   


 


Est GFR (Non-Af Amer)   


 


Random Glucose   


 


Calcium   


 


Total Bilirubin   


 


Direct Bilirubin   


 


AST   


 


ALT   


 


Alkaline Phosphatase   


 


Total Protein   


 


Albumin   


 


Globulin   


 


Albumin/Globulin Ratio   


 


Lipase   


 


Urine Color    Yellow


 


Urine Clarity    Hazy


 


Urine pH    8.0


 


Ur Specific Gravity    1.014


 


Urine Protein    Negative


 


Urine Glucose (UA)    Normal


 


Urine Ketones    Trace


 


Urine Blood    Negative


 


Urine Nitrate    Positive H


 


Urine Bilirubin    Negative


 


Urine Urobilinogen    Normal


 


Ur Leukocyte Esterase    Neg


 


Urine WBC (Auto)    1


 


Urine RBC (Auto)    3


 


Ur Squamous Epith Cells    10 H


 


Urine Bacteria    Many H


 


Urine HCG, Qual    Negative














  18





  15:54


 


WBC 


 


RBC 


 


Hgb 


 


Hct 


 


MCV 


 


MCH 


 


MCHC 


 


RDW 


 


Plt Count 


 


MPV 


 


Neut % (Auto) 


 


Lymph % (Auto) 


 


Mono % (Auto) 


 


Eos % (Auto) 


 


Baso % (Auto) 


 


Neut # 


 


Lymph # 


 


Mono # 


 


Eos # 


 


Baso # 


 


PT 


 


INR 


 


APTT 


 


Sodium  131 L


 


Potassium  3.8


 


Chloride  95 L


 


Carbon Dioxide  26


 


Anion Gap  14


 


BUN  8


 


Creatinine  0.5 L


 


Est GFR ( Amer)  > 60


 


Est GFR (Non-Af Amer)  > 60


 


Random Glucose  104


 


Calcium  8.9


 


Total Bilirubin  0.5


 


Direct Bilirubin  0.3


 


AST  16


 


ALT  23


 


Alkaline Phosphatase  76


 


Total Protein  8.5 H


 


Albumin  4.2


 


Globulin  4.3 H


 


Albumin/Globulin Ratio  1.0


 


Lipase  110


 


Urine Color 


 


Urine Clarity 


 


Urine pH 


 


Ur Specific Gravity 


 


Urine Protein 


 


Urine Glucose (UA) 


 


Urine Ketones 


 


Urine Blood 


 


Urine Nitrate 


 


Urine Bilirubin 


 


Urine Urobilinogen 


 


Ur Leukocyte Esterase 


 


Urine WBC (Auto) 


 


Urine RBC (Auto) 


 


Ur Squamous Epith Cells 


 


Urine Bacteria 


 


Urine HCG, Qual 














Assessment & Plan





- Assessment and Plan (Free Text)


Plan: 





Cholecystitis


Admit to med/surg


WBC 12.8, no left shift, bandemia


NPO diet in AM


Gen surgery consult, Dr. Cristina


- pt for Cholecystectomy in AM


US ABD (18): Immobile gallstone at GB neck. Positive sono rain sign. No 

GB wall thickening or pericholecystic edema. (see full report)


From prior admission:


CT A/P (18) Distended gallbladder. Common bile duct prominence pancreatic 

head. Evaluation regarding any potential pancreatic masses -no gross 

appreciated. No gross calculi in common bile duct appreciated. Follow-up 

recommended -initial right upper quadrant ultrasound attention to gallbladder 

common bile duct and pancreatic head recommended. Additional CT pancreatic 

protocol subsequent imaging -to be determined





Tylenol 975mg PO Q6H PRN (fever)


Dilaudid 0.5mg IV Q4H PRN (moderate pain)


LR @ 120mls/hr


Flagyl 500mg IV Q8H IV (Start 18)


Ceftriaxone 1gm Q12H IV (Start 18)


Zofran 4mg IV Q4H PRN





Pre-op labs:


Coags WNL


f/u EKG, Chest x-ray





Abnormal UA


UA (18): Positive nitrate, many bacteria, Sq epithelial


Pt on ceftriaxone for cholecystitis


f/u urine culture





Thrombocytosis


Mild 534 on presentation, believed reactionary to infection


Monitor





Depression


Continue home Trazadone





Anxiety


continue Home Buspar





Degenerative disc disease/Scoliosis


Pt on dilaudid for abd pain


Monitor





Prophylaxis


Hold anticoagulation as pt pre-op


SCDs


Protonix 40mg IV daily





Kenney Osorio PGY-2


Discussed with attending Dr. Topete

## 2018-01-23 VITALS — RESPIRATION RATE: 20 BRPM

## 2018-01-23 LAB
ALBUMIN SERPL-MCNC: 3.2 G/DL (ref 3.5–5)
ALBUMIN/GLOB SERPL: 0.9 {RATIO} (ref 1–2.1)
ALT SERPL-CCNC: 22 U/L (ref 9–52)
AST SERPL-CCNC: 22 U/L (ref 14–36)
BASOPHILS # BLD AUTO: 0.1 K/UL (ref 0–0.2)
BASOPHILS NFR BLD: 0.9 % (ref 0–2)
BUN SERPL-MCNC: 9 MG/DL (ref 7–17)
CALCIUM SERPL-MCNC: 8.6 MG/DL (ref 8.6–10.4)
EOSINOPHIL # BLD AUTO: 0.3 K/UL (ref 0–0.7)
EOSINOPHIL NFR BLD: 2.9 % (ref 0–4)
ERYTHROCYTE [DISTWIDTH] IN BLOOD BY AUTOMATED COUNT: 13.1 % (ref 11.5–14.5)
GFR NON-AFRICAN AMERICAN: > 60
HGB BLD-MCNC: 9.9 G/DL (ref 11–16)
LYMPHOCYTES # BLD AUTO: 2.3 K/UL (ref 1–4.3)
LYMPHOCYTES NFR BLD AUTO: 23.2 % (ref 20–40)
MAGNESIUM SERPL-MCNC: 1.7 MG/DL (ref 1.6–2.3)
MCH RBC QN AUTO: 26.7 PG (ref 27–31)
MCHC RBC AUTO-ENTMCNC: 33 G/DL (ref 33–37)
MCV RBC AUTO: 80.8 FL (ref 81–99)
MONOCYTES # BLD: 0.8 K/UL (ref 0–0.8)
MONOCYTES NFR BLD: 7.9 % (ref 0–10)
NEUTROPHILS # BLD: 6.4 K/UL (ref 1.8–7)
NEUTROPHILS NFR BLD AUTO: 65.1 % (ref 50–75)
NRBC BLD AUTO-RTO: 0 % (ref 0–2)
PLATELET # BLD: 372 K/UL (ref 130–400)
PMV BLD AUTO: 7.3 FL (ref 7.2–11.7)
RBC # BLD AUTO: 3.71 MIL/UL (ref 3.8–5.2)
WBC # BLD AUTO: 9.8 K/UL (ref 4.8–10.8)

## 2018-01-23 PROCEDURE — 0FT44ZZ RESECTION OF GALLBLADDER, PERCUTANEOUS ENDOSCOPIC APPROACH: ICD-10-PCS | Performed by: SURGERY

## 2018-01-23 PROCEDURE — BF101ZZ FLUOROSCOPY OF BILE DUCTS USING LOW OSMOLAR CONTRAST: ICD-10-PCS | Performed by: SURGERY

## 2018-01-23 RX ADMIN — WATER SCH MLS/HR: 1 INJECTION INTRAMUSCULAR; INTRAVENOUS; SUBCUTANEOUS at 00:52

## 2018-01-23 RX ADMIN — WATER SCH MLS/HR: 1 INJECTION INTRAMUSCULAR; INTRAVENOUS; SUBCUTANEOUS at 10:00

## 2018-01-23 RX ADMIN — WATER SCH MLS: 1 INJECTION INTRAMUSCULAR; INTRAVENOUS; SUBCUTANEOUS at 17:34

## 2018-01-23 NOTE — CP.PCM.PN
<Yolanda Merrill - Last Filed: 01/23/18 15:01>





Subjective





- Date & Time of Evaluation


Date of Evaluation: 01/23/18


Time of Evaluation: 14:39





- Subjective


Subjective: 





Medicine progress note for Dr. Topete's service





Patient was seen and examined at bedside in no acute distress. Patient's two 

daughters were at bedside. Patient reports having RUQ pain, but the pain 

medication has helped. Patient denies chest pain, shortness of breath, nausea, 

vomiting, fevers, headaches, and leg pain/swelling.





Objective





- Vital Signs/Intake and Output


Vital Signs (last 24 hours): 


 











Temp Pulse Resp BP Pulse Ox


 


 98.1 F   62   20   98/62 L  98 


 


 01/23/18 08:12  01/23/18 08:12  01/23/18 08:12  01/23/18 08:12  01/23/18 08:12








Intake and Output: 


 











 01/23/18 01/23/18





 06:59 18:59


 


Intake Total 1670 


 


Balance 1670 














- Medications


Medications: 


 Current Medications





Acetaminophen (Tylenol 325mg Tab)  975 mg PO Q6 PRN


   PRN Reason: Fever >100.4 F


Hydromorphone HCl (Dilaudid)  0.5 mg IVP Q4H PRN


   PRN Reason: Pain, severe (8-10)


Lactated Ringer's (Lactated Ringer's)  1,000 mls @ 120 mls/hr IV .Q8H20M [facility]


   Last Admin: 01/23/18 13:28 Dose:  Not Given


Ceftriaxone Sodium 1 gm/ (Sodium Chloride)  100 mls @ 100 mls/hr IVPB Q12H [facility]


   Last Admin: 01/23/18 04:12 Dose:  100 mls/hr


Metronidazole (Flagyl)  500 mg in 100 mls @ 100 mls/hr IVPB Q8H [facility]


   Last Admin: 01/23/18 10:00 Dose:  100 mls/hr


Ondansetron HCl (Zofran Inj)  4 mg IVP Q4H PRN


   PRN Reason: Nausea/Vomiting


Pantoprazole Sodium (Protonix Inj)  40 mg IVP DAILY [facility]


   Last Admin: 01/23/18 10:14 Dose:  40 mg


Pneumococcal Polyvalent Vaccine (Pneumovax 23 Vaccine)  0.5 ml IM .ONCE ONE


   Stop: 01/24/18 10:01


Trazodone HCl (Desyrel)  100 mg PO HS [facility]


   Last Admin: 01/22/18 22:32 Dose:  100 mg











- Labs


Labs: 


 





 01/23/18 06:34 





 01/23/18 06:34 





 











PT  13.1 SECONDS (9.7-12.2)  H  01/22/18  15:54    


 


INR  1.2   01/22/18  15:54    


 


APTT  32 SECONDS (21-34)   01/22/18  15:54    














- Constitutional


Appears: No Acute Distress





- Head Exam


Head Exam: ATRAUMATIC, NORMOCEPHALIC





- Eye Exam


Eye Exam: EOMI, Normal appearance, PERRL





- ENT Exam


ENT Exam: Mucous Membranes Moist





- Respiratory Exam


Respiratory Exam: Clear to Ausculation Bilateral, NORMAL BREATHING PATTERN.  

absent: Rales, Rhonchi, Wheezes, Stridor





- Cardiovascular Exam


Cardiovascular Exam: REGULAR RHYTHM, +S1, +S2





- GI/Abdominal Exam


GI & Abdominal Exam: Soft, Tenderness (RUQ), Normal Bowel Sounds.  absent: 

Distended





- Extremities Exam


Extremities Exam: Normal Inspection.  absent: Calf Tenderness, Tenderness





- Neurological Exam


Neurological Exam: Alert, Awake, Oriented x3





- Psychiatric Exam


Psychiatric exam: Normal Affect, Normal Mood





- Skin


Skin Exam: Dry, Intact, Normal Color, Warm





Assessment and Plan





- Assessment and Plan (Free Text)


Plan: 





1. Cholecystitis


* Admit to med/surg


* WBC 12.8, no left shift, bandemia


* Gen surgery consult, Dr. Cristina


 * NPO diet in AM--> patient going to OR today


* pt for Cholecystectomy in AM


* US ABD (1/22/18): Immobile gallstone at GB neck. Positive sono rain sign. 

No GB wall thickening or pericholecystic edema. (see full report)


* From prior admission:


 * CT A/P (1/16/18) Distended gallbladder. Common bile duct prominence 

pancreatic head. Evaluation regarding any potential pancreatic masses -no gross 

appreciated. No gross calculi in common bile duct appreciated. Follow-up 

recommended -initial right upper quadrant ultrasound attention to gallbladder 

common bile duct and pancreatic head recommended. Additional CT pancreatic 

protocol subsequent imaging -to be determined





Medications:


* Tylenol 975mg PO Q6H PRN (fever)


* Dilaudid 0.5mg IV Q4H PRN (moderate pain)


* LR @ 120mls/hr


* Flagyl 500mg IV Q8H IV (Start 1/22/18)


* Ceftriaxone 1gm Q12H IV (Start 1/22/18)


* Zofran 4mg IV Q4H PRN





Pre-op labs:


* Coags WNL


* EKG:nsr@77


* Chest x-ray- no active pulmonary disease





2. Abnormal UA


* UA (1/22/18): Positive nitrate, many bacteria, Sq epithelial


* Pt on ceftriaxone for cholecystitis


* f/u urine culture





3. Thrombocytosis


* Mild 534 on presentation, believed reactionary to infection


* Monitor





4. Depression


* Continue home Trazadone





5. Anxiety


* Continue Home Buspar





6. Degenerative disc disease/Scoliosis


* Pt on dilaudid for abd pain


* Monitor





7. Prophylaxis


* Hold anticoagulation as pt pre-op


* SCDs


* Protonix 40mg IV daily


* PT/OT


* Dietician referral





<Tolu Topete Jr. - Last Filed: 01/25/18 19:27>





Objective





- Vital Signs/Intake and Output


Vital Signs (last 24 hours): 


 











Temp Pulse Resp BP Pulse Ox


 


 98.7 F   95 H  20   125/78   93 L


 


 01/25/18 15:00  01/25/18 15:00  01/25/18 15:00  01/25/18 15:00  01/25/18 15:00








Intake and Output: 


 











 01/25/18 01/26/18





 18:59 06:59


 


Intake Total 1080 


 


Output Total 5 


 


Balance 1075 














- Medications


Medications: 


 Current Medications





Acetaminophen (Tylenol 325mg Tab)  975 mg PO Q6 PRN


   PRN Reason: Fever >100.4 F


   Last Admin: 01/25/18 05:58 Dose:  975 mg


Heparin Sodium (Porcine) (Heparin)  5,000 units SC Q8 LEONID


   Last Admin: 01/25/18 15:00 Dose:  5,000 units


Hydromorphone HCl (Dilaudid)  1 mg IVP Q3H PRN


   PRN Reason: Pain, severe (8-10)


   Last Admin: 01/25/18 15:46 Dose:  1 mg


Lactated Ringer's (Lactated Ringer's)  1,000 mls @ 120 mls/hr IV .Q8H20M [facility]


   Last Admin: 01/25/18 15:47 Dose:  120 mls/hr


Ceftriaxone Sodium 1 gm/ (Sodium Chloride)  100 mls @ 100 mls/hr IVPB Q12H [facility]


   Last Admin: 01/25/18 04:10 Dose:  100 mls/hr


Metronidazole (Flagyl)  500 mg in 100 mls @ 100 mls/hr IVPB Q8H [facility]


   Last Admin: 01/25/18 17:31 Dose:  100 mls/hr


Ondansetron HCl (Zofran Inj)  4 mg IVP Q4H PRN


   PRN Reason: Nausea/Vomiting


   Last Admin: 01/25/18 15:45 Dose:  4 mg


Oxycodone/Acetaminophen (Percocet 5/325 Mg Tab)  2 tab PO Q4H PRN


   PRN Reason: Pain, moderate (4-7)


   Stop: 01/26/18 18:07


Pantoprazole Sodium (Protonix Inj)  40 mg IVP DAILY [facility]


   Last Admin: 01/25/18 09:57 Dose:  40 mg


Trazodone HCl (Desyrel)  100 mg PO HS [facility]


   Last Admin: 01/24/18 21:23 Dose:  100 mg











- Labs


Labs: 


 





 01/25/18 07:42 





 01/25/18 07:42 





 











PT  13.1 SECONDS (9.7-12.2)  H  01/22/18  15:54    


 


INR  1.2   01/22/18  15:54    


 


APTT  32 SECONDS (21-34)   01/22/18  15:54    














Attending/Attestation





- Attestation


I have personally seen and examined this patient.: Yes


I have fully participated in the care of the patient.: Yes


I have reviewed all pertinent clinical information, including history, physical 

exam and plan: Yes


Notes (Text): 





01/25/18 19:27


Agree with resident note and plan of care

## 2018-01-23 NOTE — PCM.SURG1
Surgeon's Initial Post Op Note





- Surgeon's Notes


Surgeon: Dr. Cristina


Assistant: Moe Gatica(med student)


Type of Anesthesia: General Endo


Pre-Operative Diagnosis: cholecystitis


Operative Findings: Gallstones and inflamed gallbladder and patent cbd


Post-Operative Diagnosis: cholecystitis


Operation Performed: laparoscopic cholecystectomy and intraoperative 

cholangiogram


Specimen/Specimens Removed: Gallbladder


Estimated Blood Loss: EBL {In ML}: 100


Blood Products Given: N/A


Drains Used: Ilan


Post-Op Condition: Good


Date of Surgery/Procedure: 01/23/18


Time of Surgery/Procedure: 18:02

## 2018-01-23 NOTE — RAD
HISTORY:

pre-op  



COMPARISON:

No prior. 



FINDINGS:



LUNGS:

The lungs are well inflated and clear.



PLEURA:

No significant pleural effusion identified, no pneumothorax apparent.



CARDIOVASCULAR:

Normal.



OSSEOUS STRUCTURES:

No significant abnormalities.



VISUALIZED UPPER ABDOMEN:

Normal.



OTHER FINDINGS:

None.



IMPRESSION:

No active pulmonary disease.

## 2018-01-24 LAB
ALBUMIN SERPL-MCNC: 3.2 G/DL (ref 3.5–5)
ALBUMIN/GLOB SERPL: 1 {RATIO} (ref 1–2.1)
ALT SERPL-CCNC: 36 U/L (ref 9–52)
AST SERPL-CCNC: 52 U/L (ref 14–36)
BASOPHILS # BLD AUTO: 0 K/UL (ref 0–0.2)
BASOPHILS NFR BLD: 0.3 % (ref 0–2)
BUN SERPL-MCNC: 6 MG/DL (ref 7–17)
CALCIUM SERPL-MCNC: 8.4 MG/DL (ref 8.6–10.4)
EOSINOPHIL # BLD AUTO: 0 K/UL (ref 0–0.7)
EOSINOPHIL NFR BLD: 0 % (ref 0–4)
ERYTHROCYTE [DISTWIDTH] IN BLOOD BY AUTOMATED COUNT: 12.9 % (ref 11.5–14.5)
GFR NON-AFRICAN AMERICAN: > 60
HGB BLD-MCNC: 9.5 G/DL (ref 11–16)
LYMPHOCYTES # BLD AUTO: 1.6 K/UL (ref 1–4.3)
LYMPHOCYTES NFR BLD AUTO: 11.1 % (ref 20–40)
MCH RBC QN AUTO: 26.2 PG (ref 27–31)
MCHC RBC AUTO-ENTMCNC: 32.6 G/DL (ref 33–37)
MCV RBC AUTO: 80.4 FL (ref 81–99)
MONOCYTES # BLD: 1.1 K/UL (ref 0–0.8)
MONOCYTES NFR BLD: 8.1 % (ref 0–10)
NEUTROPHILS # BLD: 11.4 K/UL (ref 1.8–7)
NEUTROPHILS NFR BLD AUTO: 80.5 % (ref 50–75)
NRBC BLD AUTO-RTO: 0 % (ref 0–2)
PLATELET # BLD: 383 K/UL (ref 130–400)
PMV BLD AUTO: 7.8 FL (ref 7.2–11.7)
RBC # BLD AUTO: 3.63 MIL/UL (ref 3.8–5.2)
WBC # BLD AUTO: 14.1 K/UL (ref 4.8–10.8)

## 2018-01-24 RX ADMIN — WATER SCH MLS/HR: 1 INJECTION INTRAMUSCULAR; INTRAVENOUS; SUBCUTANEOUS at 00:59

## 2018-01-24 RX ADMIN — WATER SCH MLS/HR: 1 INJECTION INTRAMUSCULAR; INTRAVENOUS; SUBCUTANEOUS at 16:57

## 2018-01-24 RX ADMIN — WATER SCH MLS/HR: 1 INJECTION INTRAMUSCULAR; INTRAVENOUS; SUBCUTANEOUS at 08:17

## 2018-01-24 NOTE — CP.PCM.PN
Subjective





- Date & Time of Evaluation


Date of Evaluation: 01/24/18


Time of Evaluation: 14:55





- Subjective


Subjective: 


General Surgery


Pt S&E, had some emesis today with pain mainly at drain site. 





Objective





- Vital Signs/Intake and Output


Vital Signs (last 24 hours): 


 











Temp Pulse Resp BP Pulse Ox


 


 98.5 F   87   20   117/75   94 L


 


 01/24/18 15:00  01/24/18 15:00  01/24/18 15:00  01/24/18 15:00  01/24/18 15:00








Intake and Output: 


 











 01/24/18 01/24/18





 06:59 18:59


 


Intake Total 1160 2240


 


Output Total 170 25


 


Balance 990 2215














- Medications


Medications: 


 Current Medications





Acetaminophen (Tylenol 325mg Tab)  975 mg PO Q6 PRN


   PRN Reason: Fever >100.4 F


Heparin Sodium (Porcine) (Heparin)  5,000 units SC Q8 [facility]


Hydromorphone HCl (Dilaudid)  1 mg IVP Q3H PRN


   PRN Reason: Pain, severe (8-10)


Lactated Ringer's (Lactated Ringer's)  1,000 mls @ 120 mls/hr IV .Q8H20M [facility]


   Last Admin: 01/24/18 13:32 Dose:  Not Given


Ceftriaxone Sodium 1 gm/ (Sodium Chloride)  100 mls @ 100 mls/hr IVPB Q12H [facility]


   Last Admin: 01/24/18 05:02 Dose:  100 mls/hr


Metronidazole (Flagyl)  500 mg in 100 mls @ 100 mls/hr IVPB Q8H [facility]


   Last Admin: 01/24/18 16:57 Dose:  100 mls/hr


Ondansetron HCl (Zofran Inj)  4 mg IVP Q4H PRN


   PRN Reason: Nausea/Vomiting


   Last Admin: 01/24/18 13:30 Dose:  4 mg


Oxycodone/Acetaminophen (Percocet 5/325 Mg Tab)  2 tab PO Q4H PRN


   PRN Reason: Pain, moderate (4-7)


   Stop: 01/26/18 18:07


Pantoprazole Sodium (Protonix Inj)  40 mg IVP DAILY [facility]


   Last Admin: 01/24/18 11:00 Dose:  40 mg


Trazodone HCl (Desyrel)  100 mg PO HS [facility]


   Last Admin: 01/23/18 21:48 Dose:  100 mg











- Labs


Labs: 


 





 01/24/18 07:16 





 01/24/18 07:16 





 











PT  13.1 SECONDS (9.7-12.2)  H  01/22/18  15:54    


 


INR  1.2   01/22/18  15:54    


 


APTT  32 SECONDS (21-34)   01/22/18  15:54    














- Constitutional


Appears: Non-toxic, No Acute Distress





- Head Exam


Head Exam: ATRAUMATIC, NORMOCEPHALIC





- Eye Exam


Eye Exam: EOMI.  absent: Scleral icterus





- Respiratory Exam


Respiratory Exam: NORMAL BREATHING PATTERN.  absent: Respiratory Distress





- GI/Abdominal Exam


GI & Abdominal Exam: Guarding (mild), Soft, Tenderness (at drain site).  absent

: Distended, Firm, Rigid, Rebound


Additional comments: 





incisions C/D/I, lara in place with serosanguinous drainage





- Neurological Exam


Neurological Exam: Alert, Awake





- Skin


Skin Exam: Dry, Warm





Assessment and Plan





- Assessment and Plan (Free Text)


Assessment: 


52F POD#1 S/P lap cholecystectomy


Plan: 


Increased pain meds


Zofran PRN


Monitor drain output.


Serial abd exams


AM labs


D/W Dr. Jonnie Chase PGY4

## 2018-01-24 NOTE — CARD
--------------- APPROVED REPORT --------------





EKG Measurement

Heart [id number]

[place] 148P60

[id number]

[id number]

[id number]



<Conclusion>

Normal sinus rhythm

Low voltage QRS

Borderline ECG

## 2018-01-24 NOTE — CP.PCM.PN
<Yolanda Merrill - Last Filed: 01/24/18 18:05>





Subjective





- Date & Time of Evaluation


Date of Evaluation: 01/24/18


Time of Evaluation: 07:41





- Subjective


Subjective: 


Medicine progress note for Dr. Topete's service





Patient was seen and examined at bedside in the morning. Patient is s/p lap 

cholecystectomy. Patient reports having nausea and vomiting, as well as 

abdominal pain. Patient was reevaulated early afternoon and appeared better. 

Patient was less nauseas than previously and reported medication helped (given 

zofran). Patient denies chest pain, shortness of breath, nausea, vomiting, 

fevers, headaches, and leg pain/swelling.





Objective





- Vital Signs/Intake and Output


Vital Signs (last 24 hours): 


 











Temp Pulse Resp BP Pulse Ox


 


 98.3 F   102 H  20   113/73   98 


 


 01/24/18 05:19  01/24/18 05:19  01/24/18 05:19  01/24/18 05:19  01/24/18 05:19








Intake and Output: 


 











 01/24/18 01/24/18





 06:59 18:59


 


Intake Total 1160 1160


 


Output Total 170 10


 


Balance 990 1150














- Medications


Medications: 


 Current Medications





Acetaminophen (Tylenol 325mg Tab)  975 mg PO Q6 PRN


   PRN Reason: Fever >100.4 F


Heparin Sodium (Porcine) (Heparin)  5,000 units SC Q8 [facility]


Hydromorphone HCl (Dilaudid)  0.5 mg IVP Q4H PRN


   PRN Reason: Pain, severe (8-10)


   Last Admin: 01/24/18 05:14 Dose:  0.5 mg


Lactated Ringer's (Lactated Ringer's)  1,000 mls @ 120 mls/hr IV .Q8H20M [facility]


   Last Admin: 01/24/18 05:05 Dose:  Not Given


Ceftriaxone Sodium 1 gm/ (Sodium Chloride)  100 mls @ 100 mls/hr IVPB Q12H [facility]


   Last Admin: 01/24/18 05:02 Dose:  100 mls/hr


Metronidazole (Flagyl)  500 mg in 100 mls @ 100 mls/hr IVPB Q8H [facility]


   Last Admin: 01/24/18 00:59 Dose:  100 mls/hr


Ondansetron HCl (Zofran Inj)  4 mg IVP Q4H PRN


   PRN Reason: Nausea/Vomiting


   Last Admin: 01/24/18 02:23 Dose:  4 mg


Oxycodone/Acetaminophen (Percocet 5/325 Mg Tab)  2 tab PO Q4H PRN


   PRN Reason: Pain, moderate (4-7)


   Stop: 01/26/18 18:07


Pantoprazole Sodium (Protonix Inj)  40 mg IVP DAILY [facility]


   Last Admin: 01/23/18 10:14 Dose:  40 mg


Pneumococcal Polyvalent Vaccine (Pneumovax 23 Vaccine)  0.5 ml IM .ONCE ONE


   Stop: 01/24/18 10:01


Trazodone HCl (Desyrel)  100 mg PO HS [facility]


   Last Admin: 01/23/18 21:48 Dose:  100 mg











- Labs


Labs: 


 





 01/24/18 07:16 





 01/23/18 06:34 





 











PT  13.1 SECONDS (9.7-12.2)  H  01/22/18  15:54    


 


INR  1.2   01/22/18  15:54    


 


APTT  32 SECONDS (21-34)   01/22/18  15:54    














- Additional Findings


Additional findings: 





- Constitutional


Appears: No Acute Distress





- Head Exam


Head Exam: ATRAUMATIC, NORMOCEPHALIC





- Eye Exam


Eye Exam: EOMI, Normal appearance, PERRL





- ENT Exam


ENT Exam: Mucous Membranes Moist





- Respiratory Exam


Respiratory Exam: Clear to Ausculation Bilateral, NORMAL BREATHING PATTERN.  

absent: Rales, Rhonchi, Wheezes, Stridor





- Cardiovascular Exam


Cardiovascular Exam: REGULAR RHYTHM, +S1, +S2





- GI/Abdominal Exam


GI & Abdominal Exam: Soft, Tenderness (RUQ, surgical site), Normal Bowel 

Sounds.  absent: Distended


Dressings clean, dry, intact





- Extremities Exam


Extremities Exam: Normal Inspection.  absent: Calf Tenderness, Tenderness





- Neurological Exam


Neurological Exam: Alert, Awake, Oriented x3





- Psychiatric Exam


Psychiatric exam: Normal Affect, Normal Mood





- Skin


Skin Exam: Dry, Intact, Normal Color, Warm





Assessment and Plan





- Assessment and Plan (Free Text)


Plan: 





1. Cholecystitis


* Admit to med/surg


* WBC 12.8, no left shift or bandemia


* Continue to monitor CBC


* Gen surgery consult, Dr. Cristina


 * s/p lap cholecystectomy on 1/23/18 with Dr. Cristina


* US ABD (1/22/18): Immobile gallstone at GB neck. Positive sono rain sign. 

No GB wall thickening or pericholecystic edema. (see full report)


* From prior admission:


 * CT A/P (1/16/18) Distended gallbladder. Common bile duct prominence 

pancreatic head. Evaluation regarding any potential pancreatic masses -no gross 

appreciated. No gross calculi in common bile duct appreciated. Follow-up 

recommended -initial right upper quadrant ultrasound attention to gallbladder 

common bile duct and pancreatic head recommended. Additional CT pancreatic 

protocol subsequent imaging -to be determined





Medications:


* Tylenol 975mg PO Q6H PRN (fever)


* Dilaudid 0.5mg IV Q4H PRN (moderate pain)


* LR @ 120mls/hr


* Flagyl 500mg IV Q8H IV (Start 1/22/18)


* Ceftriaxone 1gm Q12H IV (Start 1/22/18)


* Zofran 4mg IV Q4H PRN





Pre-op labs:


* Coags WNL


* EKG:nsr@77


* Chest x-ray- no active pulmonary disease





2. Abnormal UA


* UA (1/22/18): Positive nitrate, many bacteria, Sq epithelial


* Pt on ceftriaxone for cholecystitis


* f/u urine culture





3. Thrombocytosis


* Resolved, 383 on 1/24/18


* Mild 534 on presentation, believed reactionary to infection


* Continue to Monitor





4. Depression


* Continue home Trazadone





5. Anxiety


* Continue Home Buspar





6. Degenerative disc disease/Scoliosis


* Pt on dilaudid for abd pain


* Monitor





7. Prophylaxis


* Hepain 5000sc


* SCDs


* Protonix 40mg IV daily


* PT/OT


* Dietician referral








<Tolu Topete Jr. - Last Filed: 01/25/18 19:32>





Objective





- Vital Signs/Intake and Output


Vital Signs (last 24 hours): 


 











Temp Pulse Resp BP Pulse Ox


 


 98.7 F   95 H  20   125/78   93 L


 


 01/25/18 15:00  01/25/18 15:00  01/25/18 15:00  01/25/18 15:00  01/25/18 15:00








Intake and Output: 


 











 01/25/18 01/26/18





 18:59 06:59


 


Intake Total 1080 


 


Output Total 5 


 


Balance 1075 














- Medications


Medications: 


 Current Medications





Acetaminophen (Tylenol 325mg Tab)  975 mg PO Q6 PRN


   PRN Reason: Fever >100.4 F


   Last Admin: 01/25/18 05:58 Dose:  975 mg


Heparin Sodium (Porcine) (Heparin)  5,000 units SC Q8 [facility]


   Last Admin: 01/25/18 15:00 Dose:  5,000 units


Hydromorphone HCl (Dilaudid)  1 mg IVP Q3H PRN


   PRN Reason: Pain, severe (8-10)


   Last Admin: 01/25/18 15:46 Dose:  1 mg


Lactated Ringer's (Lactated Ringer's)  1,000 mls @ 120 mls/hr IV .Q8H20M [facility]


   Last Admin: 01/25/18 15:47 Dose:  120 mls/hr


Ceftriaxone Sodium 1 gm/ (Sodium Chloride)  100 mls @ 100 mls/hr IVPB Q12H [facility]


   Last Admin: 01/25/18 04:10 Dose:  100 mls/hr


Metronidazole (Flagyl)  500 mg in 100 mls @ 100 mls/hr IVPB Q8H [facility]


   Last Admin: 01/25/18 17:31 Dose:  100 mls/hr


Ondansetron HCl (Zofran Inj)  4 mg IVP Q4H PRN


   PRN Reason: Nausea/Vomiting


   Last Admin: 01/25/18 15:45 Dose:  4 mg


Oxycodone/Acetaminophen (Percocet 5/325 Mg Tab)  2 tab PO Q4H PRN


   PRN Reason: Pain, moderate (4-7)


   Stop: 01/26/18 18:07


Pantoprazole Sodium (Protonix Inj)  40 mg IVP DAILY [facility]


   Last Admin: 01/25/18 09:57 Dose:  40 mg


Trazodone HCl (Desyrel)  100 mg PO HS [facility]


   Last Admin: 01/24/18 21:23 Dose:  100 mg











- Labs


Labs: 


 





 01/25/18 07:42 





 01/25/18 07:42 





 











PT  13.1 SECONDS (9.7-12.2)  H  01/22/18  15:54    


 


INR  1.2   01/22/18  15:54    


 


APTT  32 SECONDS (21-34)   01/22/18  15:54    














Attending/Attestation





- Attestation


I have personally seen and examined this patient.: Yes


I have fully participated in the care of the patient.: Yes


I have reviewed all pertinent clinical information, including history, physical 

exam and plan: Yes


Notes (Text): 





01/25/18 19:31


Agree with resident note and plan of care

## 2018-01-24 NOTE — OP
PROCEDURE DATE:  01/23/2018



PREOPERATIVE DIAGNOSIS:  Acute cholecystitis.



POSTOPERATIVE DIAGNOSIS:  Acute cholecystitis.



PROCEDURE CARRIED OUT:  Laparoscopic cholecystectomy with C-arm

cholangiogram.



SURGEON:  Darrell Cristina Jr., MD



ASSISTANT:  Dr. Weaver.



TYPE OF ANESTHESIA:  General anesthesia.



ANESTHESIA ADMINISTERED BY:  Dr. Pitt and Dr. Goodson.



INDICATIONS:  A 52-year-old woman with history of depression and some

severe abdominal pain, found to have gallstones and thick-walled

gallbladder.



OPERATIVE FINDINGS:  Acutely inflamed gallbladder of many days' duration. 

This is a very difficult operation.  Difficulty came from the inflammation

and adherence of the gallbladder to the adjacent structures.  There were 2

large gallstones.  A cholangiogram carried out to the cystic duct showed

free flow into the duodenum and visualization of the hepatic radicles. 

This was a difficult portion _____ with all the adhesions.  The bleeding

that came from the liver bed, although was not excessive in the amount of

100 to 200 mL, it did require constant reassessment of the liver bed and

constant control with the use of cautery.



DESCRIPTION OF PROCEDURE:  The patient was given general anesthesia and

intravenous antibiotics.  Venodyne boots were applied.  A Eva trocar was

inserted via cut-down technique.  Two additional trocars were placed.  The

cystic duct and cystic artery were identified, view of safety obtained. 

After this have been clipped, the cholangiogram was carried out, and then

the cystic artery was clipped.  We then removed the gallbladder from the

bed and this was the most difficult part of the operation getting this off

the liver bed.  Eventually, this was done successfully with good

hemostasis, although very tedious.  We then removed the gallbladder from

the field into bag, and again, this was quite difficult due to the large

size of the stones, large size of the gallbladder and the small size of the

umbilical incision.  After these have been done, we then irrigated

everything out to remove as much fluid as possible, checked two or three

separate times for hemostasis the liver bed which was adequate, and then we

closed the abdomen.  A drain was left because of the amount of irrigation

fluid that was used.  Blood loss was 100 to 200 mL.  Operation,

laparoscopic cholecystectomy with C-arm cholangiogram, more difficult than

usual due to the adhesions and inflammation present.













__________________________________________

Darrell Cristina Jr., MD



DD:  01/23/2018 17:52:25

DT:  01/23/2018 19:27:38

Job # [id number]

## 2018-01-25 LAB
ALBUMIN SERPL-MCNC: 3 G/DL (ref 3.5–5)
ALBUMIN/GLOB SERPL: 0.9 {RATIO} (ref 1–2.1)
ALT SERPL-CCNC: 36 U/L (ref 9–52)
AST SERPL-CCNC: 38 U/L (ref 14–36)
BASOPHILS # BLD AUTO: 0.1 K/UL (ref 0–0.2)
BASOPHILS NFR BLD: 0.5 % (ref 0–2)
BUN SERPL-MCNC: 4 MG/DL (ref 7–17)
CALCIUM SERPL-MCNC: 8.4 MG/DL (ref 8.6–10.4)
EOSINOPHIL # BLD AUTO: 0 K/UL (ref 0–0.7)
EOSINOPHIL NFR BLD: 0.3 % (ref 0–4)
ERYTHROCYTE [DISTWIDTH] IN BLOOD BY AUTOMATED COUNT: 12.9 % (ref 11.5–14.5)
GFR NON-AFRICAN AMERICAN: > 60
HGB BLD-MCNC: 9.2 G/DL (ref 11–16)
LYMPHOCYTES # BLD AUTO: 2.3 K/UL (ref 1–4.3)
LYMPHOCYTES NFR BLD AUTO: 19 % (ref 20–40)
MCH RBC QN AUTO: 27.5 PG (ref 27–31)
MCHC RBC AUTO-ENTMCNC: 34.1 G/DL (ref 33–37)
MCV RBC AUTO: 80.6 FL (ref 81–99)
MONOCYTES # BLD: 1.1 K/UL (ref 0–0.8)
MONOCYTES NFR BLD: 9.1 % (ref 0–10)
NEUTROPHILS # BLD: 8.7 K/UL (ref 1.8–7)
NEUTROPHILS NFR BLD AUTO: 71.1 % (ref 50–75)
NRBC BLD AUTO-RTO: 0 % (ref 0–2)
PLATELET # BLD: 333 K/UL (ref 130–400)
PMV BLD AUTO: 7.6 FL (ref 7.2–11.7)
RBC # BLD AUTO: 3.35 MIL/UL (ref 3.8–5.2)
WBC # BLD AUTO: 12.2 K/UL (ref 4.8–10.8)

## 2018-01-25 RX ADMIN — WATER SCH MLS/HR: 1 INJECTION INTRAMUSCULAR; INTRAVENOUS; SUBCUTANEOUS at 00:08

## 2018-01-25 RX ADMIN — WATER SCH MLS/HR: 1 INJECTION INTRAMUSCULAR; INTRAVENOUS; SUBCUTANEOUS at 08:58

## 2018-01-25 RX ADMIN — WATER SCH MLS/HR: 1 INJECTION INTRAMUSCULAR; INTRAVENOUS; SUBCUTANEOUS at 17:31

## 2018-01-25 NOTE — CP.PCM.PN
Subjective





- Date & Time of Evaluation


Date of Evaluation: 01/25/18


Time of Evaluation: 11:07





- Subjective


Subjective: 





Medicine progress note for Dr. Topete's service





Patient was seen and examined at bedside in the morning. Patient is s/p lap 

cholecystectomy. Patient reports having abdominal pain and excessive gas. 

Patient is tolerating diet and ambulating. Patient denies chest pain, shortness 

of breath, nausea, vomiting, fevers, headaches, and leg pain/swelling.





Objective





- Vital Signs/Intake and Output


Vital Signs (last 24 hours): 


 











Temp Pulse Resp BP Pulse Ox


 


 98.6 F   99 H  20   110/75   95 


 


 01/25/18 07:50  01/25/18 07:50  01/25/18 07:50  01/25/18 07:50  01/25/18 07:50








Intake and Output: 


 











 01/25/18 01/25/18





 06:59 18:59


 


Intake Total 1060 1080


 


Output Total 25 5


 


Balance 1035 1075














- Medications


Medications: 


 Current Medications





Acetaminophen (Tylenol 325mg Tab)  975 mg PO Q6 PRN


   PRN Reason: Fever >100.4 F


   Last Admin: 01/25/18 05:58 Dose:  975 mg


Heparin Sodium (Porcine) (Heparin)  5,000 units SC Q8 [facility]


   Last Admin: 01/25/18 05:59 Dose:  5,000 units


Hydromorphone HCl (Dilaudid)  1 mg IVP Q3H PRN


   PRN Reason: Pain, severe (8-10)


   Last Admin: 01/25/18 09:58 Dose:  1 mg


Lactated Ringer's (Lactated Ringer's)  1,000 mls @ 120 mls/hr IV .Q8H20M [facility]


   Last Admin: 01/25/18 05:43 Dose:  Not Given


Ceftriaxone Sodium 1 gm/ (Sodium Chloride)  100 mls @ 100 mls/hr IVPB Q12H [facility]


   Last Admin: 01/25/18 04:10 Dose:  100 mls/hr


Metronidazole (Flagyl)  500 mg in 100 mls @ 100 mls/hr IVPB Q8H [facility]


   Last Admin: 01/25/18 08:58 Dose:  100 mls/hr


Ondansetron HCl (Zofran Inj)  4 mg IVP Q4H [facility]


   PRN Reason: Nausea/Vomiting


   Last Admin: 01/24/18 19:19 Dose:  4 mg


Oxycodone/Acetaminophen (Percocet 5/325 Mg Tab)  2 tab PO Q4H PRN


   PRN Reason: Pain, moderate (4-7)


   Stop: 01/26/18 18:07


Pantoprazole Sodium (Protonix Inj)  40 mg IVP DAILY [facility]


   Last Admin: 01/25/18 09:57 Dose:  40 mg


Trazodone HCl (Desyrel)  100 mg PO HS [facility]


   Last Admin: 01/24/18 21:23 Dose:  100 mg











- Labs


Labs: 


 





 01/25/18 07:42 





 01/25/18 07:42 





 











PT  13.1 SECONDS (9.7-12.2)  H  01/22/18  15:54    


 


INR  1.2   01/22/18  15:54    


 


APTT  32 SECONDS (21-34)   01/22/18  15:54    














- Additional Findings


Additional findings: 





- Constitutional


Appears: No Acute Distress





- Head Exam


Head Exam: ATRAUMATIC, NORMOCEPHALIC





- Eye Exam


Eye Exam: EOMI, Normal appearance, PERRL





- ENT Exam


ENT Exam: Mucous Membranes Moist





- Respiratory Exam


Respiratory Exam: Clear to Ausculation Bilateral, NORMAL BREATHING PATTERN.  

absent: Rales, Rhonchi, Wheezes, Stridor





- Cardiovascular Exam


Cardiovascular Exam: REGULAR RHYTHM, +S1, +S2





- GI/Abdominal Exam


GI & Abdominal Exam: Soft, Tenderness (RUQ, surgical site), Normal Bowel 

Sounds.  absent: Distended


Dressings clean, dry, intact





- Extremities Exam


Extremities Exam: Normal Inspection.  absent: Calf Tenderness, Tenderness





- Neurological Exam


Neurological Exam: Alert, Awake, Oriented x3





- Psychiatric Exam


Psychiatric exam: Normal Affect, Normal Mood





- Skin


Skin Exam: Dry, Intact, Normal Color, Warm








Assessment and Plan





- Assessment and Plan (Free Text)


Plan: 





1. Cholecystitis


* Admit to med/surg


* Continue to monitor CBC


* Gen surgery consult, Dr. Cristina


 * s/p lap cholecystectomy on 1/23/18 with Dr. Cristina


* US ABD (1/22/18): Immobile gallstone at GB neck. Positive sono rain sign. 

No GB wall thickening or pericholecystic edema. (see full report)


* From prior admission:


 * CT A/P (1/16/18) Distended gallbladder. Common bile duct prominence 

pancreatic head. Evaluation regarding any potential pancreatic masses -no gross 

appreciated. No gross calculi in common bile duct appreciated. Follow-up 

recommended -initial right upper quadrant ultrasound attention to gallbladder 

common bile duct and pancreatic head recommended. Additional CT pancreatic 

protocol subsequent imaging -to be determined





Medications:


* Tylenol 975mg PO Q6H PRN (fever)


* Dilaudid 0.5mg IV Q4H PRN (moderate pain)


* LR @ 120mls/hr


* Flagyl 500mg IV Q8H IV (Start 1/22/18)


* Ceftriaxone 1gm Q12H IV (Start 1/22/18)


* Zofran 4mg IV Q4H PRN





Pre-op labs:


* Coags WNL


* EKG:nsr@77


* Chest x-ray- no active pulmonary disease





2. Abnormal UA


* UA (1/22/18): Positive nitrate, many bacteria, Sq epithelial


* Pt on ceftriaxone for cholecystitis


* urine culture: no growth





3. Thrombocytosis


* Resolved, 383 on 1/24/18


* Mild 534 on presentation, believed reactionary to infection


* Continue to Monitor





4. Depression


* Continue home Trazadone





5. Anxiety


* Continue Home Buspar





6. Degenerative disc disease/Scoliosis


* Pt on dilaudid for abd pain


* Monitor





7. Prophylaxis


* Hepain 5000sc


* SCDs


* Protonix 40mg IV daily


* PT/OT


* Dietician referral


* Encouraged OOB, ambulation, and IS





Discussed with Dr. Topete.

## 2018-01-25 NOTE — CP.PCM.PN
Subjective





- Date & Time of Evaluation


Date of Evaluation: 01/25/18


Time of Evaluation: 06:45





- Subjective


Subjective: 





General surgery progress note for Dr. Hue Dominguez, PGY-1





Pt S & E at bedside this AM.  





Pt reports increased abdominal pain, some emesis. Admits to flatus, is 

ambulating to bathroom. Drain with 10 serosanguinous output.





Objective





- Vital Signs/Intake and Output


Vital Signs (last 24 hours): 


 











Temp Pulse Resp BP Pulse Ox


 


 98.6 F   99 H  20   110/75   95 


 


 01/25/18 07:50  01/25/18 07:50  01/25/18 07:50  01/25/18 07:50  01/25/18 07:50








Intake and Output: 


 











 01/25/18 01/25/18





 06:59 18:59


 


Intake Total 1060 1080


 


Output Total 25 5


 


Balance 1035 1075














- Medications


Medications: 


 Current Medications





Acetaminophen (Tylenol 325mg Tab)  975 mg PO Q6 PRN


   PRN Reason: Fever >100.4 F


   Last Admin: 01/25/18 05:58 Dose:  975 mg


Heparin Sodium (Porcine) (Heparin)  5,000 units SC Q8 [facility]


   Last Admin: 01/25/18 05:59 Dose:  5,000 units


Hydromorphone HCl (Dilaudid)  1 mg IVP Q3H PRN


   PRN Reason: Pain, severe (8-10)


   Last Admin: 01/25/18 09:58 Dose:  1 mg


Lactated Ringer's (Lactated Ringer's)  1,000 mls @ 120 mls/hr IV .Q8H20M [facility]


   Last Admin: 01/25/18 05:43 Dose:  Not Given


Ceftriaxone Sodium 1 gm/ (Sodium Chloride)  100 mls @ 100 mls/hr IVPB Q12H [facility]


   Last Admin: 01/25/18 04:10 Dose:  100 mls/hr


Metronidazole (Flagyl)  500 mg in 100 mls @ 100 mls/hr IVPB Q8H [facility]


   Last Admin: 01/25/18 08:58 Dose:  100 mls/hr


Ondansetron HCl (Zofran Inj)  4 mg IVP Q4H PRN


   PRN Reason: Nausea/Vomiting


   Last Admin: 01/24/18 19:19 Dose:  4 mg


Oxycodone/Acetaminophen (Percocet 5/325 Mg Tab)  2 tab PO Q4H PRN


   PRN Reason: Pain, moderate (4-7)


   Stop: 01/26/18 18:07


Pantoprazole Sodium (Protonix Inj)  40 mg IVP DAILY [facility]


   Last Admin: 01/25/18 09:57 Dose:  40 mg


Trazodone HCl (Desyrel)  100 mg PO HS [facility]


   Last Admin: 01/24/18 21:23 Dose:  100 mg











- Labs


Labs: 


 





 01/25/18 07:42 





 01/25/18 07:42 





 











PT  13.1 SECONDS (9.7-12.2)  H  01/22/18  15:54    


 


INR  1.2   01/22/18  15:54    


 


APTT  32 SECONDS (21-34)   01/22/18  15:54    














- Constitutional


Appears: Non-toxic, No Acute Distress





- Head Exam


Head Exam: ATRAUMATIC, NORMAL INSPECTION, NORMOCEPHALIC





- Eye Exam


Eye Exam: EOMI, Normal appearance





- ENT Exam


ENT Exam: Mucous Membranes Moist, Normal Exam





- Neck Exam


Neck Exam: Full ROM, Normal Inspection





- Respiratory Exam


Respiratory Exam: NORMAL BREATHING PATTERN





- Cardiovascular Exam


Cardiovascular Exam: REGULAR RHYTHM, +S1, +S2





- GI/Abdominal Exam


GI & Abdominal Exam: Guarding (mild), Soft, Tenderness (around drain site).  

absent: Firm, Rigid





- Extremities Exam


Extremities Exam: Normal Inspection





- Neurological Exam


Neurological Exam: Alert, Awake, CN II-XII Intact, Oriented x3





- Psychiatric Exam


Psychiatric exam: Normal Affect, Normal Mood





- Skin


Skin Exam: Dry, Intact, Normal Color, Warm





Assessment and Plan





- Assessment and Plan (Free Text)


Assessment: 





52F POD#2 s/p lap cholecystectomy with continued ab pain post op


Plan: 








Cont pain meds


Cont Abx


Leukocytosis improving


Zofran PRN


Monitor drain output


Serial ab exams


Replace electrolytes PRN


Ambulate


OOBTC


Encourage IS use


Further mgmt as per primary team





DW attending





Angelica, PGY-1

## 2018-01-25 NOTE — RAD
PROCEDURE:  



HISTORY:

Acute cholecystitis



COMPARISON:

None



TECHNIQUE:

Total fluoroscopic time utilized during the procedure: 47.4 seconds. 

Total dose 13.53 mGy cm squared



FINDINGS:

Submitted images from the current procedure: 5 Please refer to the 

physician's notes performing the procedure. 



IMPRESSION:

Less than 1 hour fluoroscopic time utilized during performance of the 

procedure

## 2018-01-26 LAB
ALBUMIN SERPL-MCNC: 3 G/DL (ref 3.5–5)
ALBUMIN/GLOB SERPL: 0.9 {RATIO} (ref 1–2.1)
ALT SERPL-CCNC: 35 U/L (ref 9–52)
AST SERPL-CCNC: 36 U/L (ref 14–36)
BASOPHILS # BLD AUTO: 0.1 K/UL (ref 0–0.2)
BASOPHILS NFR BLD: 0.5 % (ref 0–2)
BUN SERPL-MCNC: 6 MG/DL (ref 7–17)
CALCIUM SERPL-MCNC: 8.5 MG/DL (ref 8.6–10.4)
EOSINOPHIL # BLD AUTO: 0.1 K/UL (ref 0–0.7)
EOSINOPHIL NFR BLD: 0.8 % (ref 0–4)
ERYTHROCYTE [DISTWIDTH] IN BLOOD BY AUTOMATED COUNT: 12.6 % (ref 11.5–14.5)
GFR NON-AFRICAN AMERICAN: > 60
HGB BLD-MCNC: 9.4 G/DL (ref 11–16)
LYMPHOCYTES # BLD AUTO: 2.9 K/UL (ref 1–4.3)
LYMPHOCYTES NFR BLD AUTO: 24.9 % (ref 20–40)
MCH RBC QN AUTO: 27.1 PG (ref 27–31)
MCHC RBC AUTO-ENTMCNC: 33.7 G/DL (ref 33–37)
MCV RBC AUTO: 80.3 FL (ref 81–99)
MONOCYTES # BLD: 1 K/UL (ref 0–0.8)
MONOCYTES NFR BLD: 8.3 % (ref 0–10)
NEUTROPHILS # BLD: 7.5 K/UL (ref 1.8–7)
NEUTROPHILS NFR BLD AUTO: 65.5 % (ref 50–75)
NRBC BLD AUTO-RTO: 0 % (ref 0–2)
PLATELET # BLD: 376 K/UL (ref 130–400)
PMV BLD AUTO: 7.6 FL (ref 7.2–11.7)
RBC # BLD AUTO: 3.47 MIL/UL (ref 3.8–5.2)
WBC # BLD AUTO: 11.5 K/UL (ref 4.8–10.8)

## 2018-01-26 RX ADMIN — WATER SCH MLS/HR: 1 INJECTION INTRAMUSCULAR; INTRAVENOUS; SUBCUTANEOUS at 09:42

## 2018-01-26 RX ADMIN — WATER SCH MLS/HR: 1 INJECTION INTRAMUSCULAR; INTRAVENOUS; SUBCUTANEOUS at 01:55

## 2018-01-26 RX ADMIN — WATER SCH MLS/HR: 1 INJECTION INTRAMUSCULAR; INTRAVENOUS; SUBCUTANEOUS at 16:39

## 2018-01-26 NOTE — CP.PCM.PN
Subjective





- Date & Time of Evaluation


Date of Evaluation: 01/26/18


Time of Evaluation: 14:29





- Subjective


Subjective: 





Medicine progress note for Dr. Topete's service





Patient was seen and examined at bedside in the morning. Patient is s/p lap 

cholecystectomy. Patient reports still having abdominal pain and excessive gas, 

no longer nausea or vomiting. Patient is complaining of headache and requesting 

pain medication. Patient is also requesting to get out of bed and sit in the 

chair. Patient denies chest pain, shortness of breath, nausea, vomiting, fevers

, and leg pain/swelling.








Objective





- Vital Signs/Intake and Output


Vital Signs (last 24 hours): 


 











Temp Pulse Resp BP Pulse Ox


 


 98.2 F   90   20   134/86   97 


 


 01/26/18 07:57  01/26/18 12:30  01/26/18 07:57  01/26/18 12:30  01/26/18 12:30








Intake and Output: 


 











 01/26/18 01/26/18





 06:59 18:59


 


Intake Total 2160 


 


Output Total 10 


 


Balance 2150 














- Medications


Medications: 


 Current Medications





Acetaminophen (Tylenol 325mg Tab)  650 mg PO Q6 PRN


   PRN Reason: Headache


Heparin Sodium (Porcine) (Heparin)  5,000 units SC Q8 [facility]


   Last Admin: 01/26/18 13:49 Dose:  5,000 units


Hydromorphone HCl (Dilaudid)  1 mg IVP Q3H PRN


   PRN Reason: Pain, severe (8-10)


   Last Admin: 01/26/18 03:42 Dose:  1 mg


Lactated Ringer's (Lactated Ringer's)  1,000 mls @ 120 mls/hr IV .Q8H20M [facility]


   Last Admin: 01/26/18 06:01 Dose:  Not Given


Ceftriaxone Sodium 1 gm/ (Sodium Chloride)  100 mls @ 100 mls/hr IVPB Q12H [facility]


   Last Admin: 01/26/18 05:04 Dose:  100 mls/hr


Metronidazole (Flagyl)  500 mg in 100 mls @ 100 mls/hr IVPB Q8H [facility]


   Last Admin: 01/26/18 09:42 Dose:  100 mls/hr


Ondansetron HCl (Zofran Inj)  4 mg IVP Q4H PRN


   PRN Reason: Nausea/Vomiting


   Last Admin: 01/25/18 15:45 Dose:  4 mg


Oxycodone/Acetaminophen (Percocet 5/325 Mg Tab)  2 tab PO Q4H PRN


   PRN Reason: Pain, moderate (4-7)


   Stop: 01/26/18 18:07


   Last Admin: 01/26/18 14:00 Dose:  2 tab


Pantoprazole Sodium (Protonix Inj)  40 mg IVP DAILY [facility]


   Last Admin: 01/26/18 09:43 Dose:  40 mg


Trazodone HCl (Desyrel)  100 mg PO HS [facility]


   Last Admin: 01/25/18 21:41 Dose:  100 mg











- Labs


Labs: 


 





 01/26/18 08:03 





 01/26/18 08:03 





 











PT  13.1 SECONDS (9.7-12.2)  H  01/22/18  15:54    


 


INR  1.2   01/22/18  15:54    


 


APTT  32 SECONDS (21-34)   01/22/18  15:54    














- Additional Findings


Additional findings: 





- Constitutional


Appears: No Acute Distress





- Head Exam


Head Exam: ATRAUMATIC, NORMOCEPHALIC





- Eye Exam


Eye Exam: EOMI, Normal appearance, PERRL





- ENT Exam


ENT Exam: Mucous Membranes Moist





- Respiratory Exam


Respiratory Exam: Clear to Ausculation Bilateral, NORMAL BREATHING PATTERN.  

absent: Rales, Rhonchi, Wheezes, Stridor





- Cardiovascular Exam


Cardiovascular Exam: REGULAR RHYTHM, +S1, +S2





- GI/Abdominal Exam


GI & Abdominal Exam: Soft, Tenderness (RUQ, surgical site), Normal Bowel 

Sounds.  absent: Distended


Dressings removed; steri strips in place; clean, dry, intact; drain present.





- Extremities Exam


Extremities Exam: Normal Inspection.  absent: Calf Tenderness, Tenderness





- Neurological Exam


Neurological Exam: Alert, Awake, Oriented x3





- Psychiatric Exam


Psychiatric exam: Normal Affect, Normal Mood





- Skin


Skin Exam: Dry, Intact, Normal Color, Warm











Assessment and Plan





- Assessment and Plan (Free Text)


Plan: 





1. Cholecystitis


* Admit to med/surg


* Continue to monitor CBC


* Gen surgery consult, Dr. Cristina


 * s/p lap cholecystectomy on 1/23/18 with Dr. Cristina


* US ABD (1/22/18): Immobile gallstone at GB neck. Positive sono rain sign. 

No GB wall thickening or pericholecystic edema. (see full report)


* From prior admission:


 * CT A/P (1/16/18) Distended gallbladder. Common bile duct prominence 

pancreatic head. Evaluation regarding any potential pancreatic masses -no gross 

appreciated. No gross calculi in common bile duct appreciated. Follow-up 

recommended -initial right upper quadrant ultrasound attention to gallbladder 

common bile duct and pancreatic head recommended. Additional CT pancreatic 

protocol subsequent imaging -to be determined





Medications:


* Tylenol 975mg PO Q6H PRN (fever)


* Dilaudid 0.5mg IV Q4H PRN (moderate pain)


* LR @ 120mls/hr


* Flagyl 500mg IV Q8H IV (Start 1/22/18)


* Ceftriaxone 1gm Q12H IV (Start 1/22/18)


* Zofran 4mg IV Q4H PRN





Pre-op labs:


* Coags WNL


* EKG:nsr@77


* Chest x-ray- no active pulmonary disease





2. Abnormal UA


* UA (1/22/18): Positive nitrate, many bacteria, Sq epithelial


* Pt on ceftriaxone for cholecystitis


* urine culture: no growth





3. Thrombocytosis


* Resolved, 383 on 1/24/18


* Mild 534 on presentation, believed reactionary to infection


* Continue to Monitor





4. Depression


* Continue home Trazadone





5. Anxiety


* Continue Home Buspar





6. Degenerative disc disease/Scoliosis


* Pt on dilaudid for abd pain


* Monitor





7. Prophylaxis


* Hepain 5000sc


* SCDs


* Protonix 40mg IV daily


* PT/OT


* Dietician referral


* Encouraged OOB, ambulation, and IS





Discussed with Dr. Topete.

## 2018-01-26 NOTE — CP.PCM.PN
Subjective





- Date & Time of Evaluation


Date of Evaluation: 01/26/18


Time of Evaluation: 07:05





- Subjective


Subjective: 





General Surgery Note for Dr. Cristina





Patient seen and examined at bedside. No acute event overnight. Patient states 

pain has improved. No nausea/vomiting today. 





Objective





- Vital Signs/Intake and Output


Vital Signs (last 24 hours): 


 











Temp Pulse Resp BP Pulse Ox


 


 98.3 F   92 H  20   102/60   95 


 


 01/26/18 16:00  01/26/18 16:00  01/26/18 16:00  01/26/18 16:00  01/26/18 16:00








Intake and Output: 


 











 01/26/18 01/27/18





 18:59 06:59


 


Intake Total 1500 


 


Output Total 10 


 


Balance 1490 














- Medications


Medications: 


 Current Medications





Acetaminophen (Tylenol 325mg Tab)  650 mg PO Q6 PRN


   PRN Reason: Headache


Heparin Sodium (Porcine) (Heparin)  5,000 units SC Q8 [facility]


   Last Admin: 01/26/18 13:49 Dose:  5,000 units


Hydromorphone HCl (Dilaudid)  1 mg IVP Q3H PRN


   PRN Reason: Pain, severe (8-10)


   Last Admin: 01/26/18 03:42 Dose:  1 mg


Lactated Ringer's (Lactated Ringer's)  1,000 mls @ 120 mls/hr IV .Q8H20M [facility]


   Last Admin: 01/26/18 15:56 Dose:  Not Given


Ceftriaxone Sodium 1 gm/ (Sodium Chloride)  100 mls @ 100 mls/hr IVPB Q12H [facility]


   Last Admin: 01/26/18 16:37 Dose:  100 mls/hr


Metronidazole (Flagyl)  500 mg in 100 mls @ 100 mls/hr IVPB Q8H [facility]


   Last Admin: 01/26/18 16:39 Dose:  100 mls/hr


Ondansetron HCl (Zofran Inj)  4 mg IVP Q4H PRN


   PRN Reason: Nausea/Vomiting


   Last Admin: 01/25/18 15:45 Dose:  4 mg


Pantoprazole Sodium (Protonix Inj)  40 mg IVP DAILY [facility]


   Last Admin: 01/26/18 09:43 Dose:  40 mg


Trazodone HCl (Desyrel)  100 mg PO HS [facility]


   Last Admin: 01/25/18 21:41 Dose:  100 mg











- Labs


Labs: 


 





 01/26/18 08:03 





 01/26/18 08:03 





 











PT  13.1 SECONDS (9.7-12.2)  H  01/22/18  15:54    


 


INR  1.2   01/22/18  15:54    


 


APTT  32 SECONDS (21-34)   01/22/18  15:54    














- Constitutional


Appears: No Acute Distress





- Head Exam


Head Exam: ATRAUMATIC, NORMOCEPHALIC





- Eye Exam


Eye Exam: Normal appearance





- ENT Exam


ENT Exam: Mucous Membranes Moist





- Respiratory Exam


Respiratory Exam: NORMAL BREATHING PATTERN





- Cardiovascular Exam


Cardiovascular Exam: REGULAR RHYTHM





- GI/Abdominal Exam


GI & Abdominal Exam: Soft, Tenderness, Normal Bowel Sounds.  absent: Distended, 

Firm, Guarding, Rigid, Rebound





- Extremities Exam


Extremities Exam: Normal Capillary Refill





- Neurological Exam


Neurological Exam: Alert, Awake, Oriented x3





- Psychiatric Exam


Psychiatric exam: Normal Affect, Normal Mood





- Skin


Skin Exam: Dry, Intact, Normal Color, Warm





Assessment and Plan





- Assessment and Plan (Free Text)


Plan: 





52F s/p lap cholecystectomy POD#3


 


IV antibiotics


Analgesics/Anti-emetics PRN


Monitor drain output


Ambulate/OOB/IS


Management as per primary


Discussed with Dr. Jonnie Desir PGY1

## 2018-01-27 VITALS
SYSTOLIC BLOOD PRESSURE: 125 MMHG | TEMPERATURE: 98.2 F | DIASTOLIC BLOOD PRESSURE: 79 MMHG | HEART RATE: 93 BPM | OXYGEN SATURATION: 95 %

## 2018-01-27 LAB
ALBUMIN SERPL-MCNC: 2.8 G/DL (ref 3.5–5)
ALBUMIN/GLOB SERPL: 0.9 {RATIO} (ref 1–2.1)
ALT SERPL-CCNC: 32 U/L (ref 9–52)
AST SERPL-CCNC: 30 U/L (ref 14–36)
BASOPHILS # BLD AUTO: 0.1 K/UL (ref 0–0.2)
BASOPHILS NFR BLD: 0.7 % (ref 0–2)
BUN SERPL-MCNC: 4 MG/DL (ref 7–17)
CALCIUM SERPL-MCNC: 8.2 MG/DL (ref 8.6–10.4)
EOSINOPHIL # BLD AUTO: 0.1 K/UL (ref 0–0.7)
EOSINOPHIL NFR BLD: 0.6 % (ref 0–4)
ERYTHROCYTE [DISTWIDTH] IN BLOOD BY AUTOMATED COUNT: 12.9 % (ref 11.5–14.5)
GFR NON-AFRICAN AMERICAN: > 60
HGB BLD-MCNC: 9 G/DL (ref 11–16)
LYMPHOCYTES # BLD AUTO: 2 K/UL (ref 1–4.3)
LYMPHOCYTES NFR BLD AUTO: 17.9 % (ref 20–40)
MCH RBC QN AUTO: 27 PG (ref 27–31)
MCHC RBC AUTO-ENTMCNC: 33.8 G/DL (ref 33–37)
MCV RBC AUTO: 80 FL (ref 81–99)
MONOCYTES # BLD: 0.8 K/UL (ref 0–0.8)
MONOCYTES NFR BLD: 6.6 % (ref 0–10)
NEUTROPHILS # BLD: 8.5 K/UL (ref 1.8–7)
NEUTROPHILS NFR BLD AUTO: 74.2 % (ref 50–75)
NRBC BLD AUTO-RTO: 0 % (ref 0–2)
PLATELET # BLD: 407 K/UL (ref 130–400)
PMV BLD AUTO: 7.4 FL (ref 7.2–11.7)
RBC # BLD AUTO: 3.35 MIL/UL (ref 3.8–5.2)
WBC # BLD AUTO: 11.4 K/UL (ref 4.8–10.8)

## 2018-01-27 RX ADMIN — WATER SCH MLS/HR: 1 INJECTION INTRAMUSCULAR; INTRAVENOUS; SUBCUTANEOUS at 09:42

## 2018-01-27 RX ADMIN — WATER SCH MLS/HR: 1 INJECTION INTRAMUSCULAR; INTRAVENOUS; SUBCUTANEOUS at 01:33

## 2018-01-27 RX ADMIN — WATER SCH MLS/HR: 1 INJECTION INTRAMUSCULAR; INTRAVENOUS; SUBCUTANEOUS at 17:54

## 2018-01-27 NOTE — CP.PCM.PN
Subjective





- Date & Time of Evaluation


Date of Evaluation: 01/27/18


Time of Evaluation: 01:19





- Subjective


Subjective: 





General Surgery:  Dr Cristina





PT S&E.  NAEO.  Has been feeling much better since surgery.  Minimal abdominal 

pain.  Tolerating HHD.  Passing flatus.  Denies n/v, sob, f/c, chest pains.  





Objective





- Vital Signs/Intake and Output


Vital Signs (last 24 hours): 


 











Temp Pulse Resp BP Pulse Ox


 


 98.3 F   92 H  20   102/60   95 


 


 01/26/18 16:00  01/26/18 16:00  01/26/18 16:00  01/26/18 16:00  01/26/18 16:00








Intake and Output: 


 











 01/26/18 01/27/18





 18:59 06:59


 


Intake Total 1500 1210


 


Output Total 10 505


 


Balance 1490 705














- Medications


Medications: 


 Current Medications





Acetaminophen (Tylenol 325mg Tab)  650 mg PO Q6 PRN


   PRN Reason: Headache


   Last Admin: 01/26/18 20:01 Dose:  650 mg


Heparin Sodium (Porcine) (Heparin)  5,000 units SC Q8 [facility]


   Last Admin: 01/26/18 21:28 Dose:  5,000 units


Hydromorphone HCl (Dilaudid)  1 mg IVP Q3H PRN


   PRN Reason: Pain, severe (8-10)


   Last Admin: 01/26/18 21:17 Dose:  1 mg


Lactated Ringer's (Lactated Ringer's)  1,000 mls @ 120 mls/hr IV .Q8H20M [facility]


   Last Admin: 01/26/18 15:56 Dose:  Not Given


Ceftriaxone Sodium 1 gm/ (Sodium Chloride)  100 mls @ 100 mls/hr IVPB Q12H [facility]


   Last Admin: 01/26/18 16:37 Dose:  100 mls/hr


Metronidazole (Flagyl)  500 mg in 100 mls @ 100 mls/hr IVPB Q8H [facility]


   Last Admin: 01/26/18 16:39 Dose:  100 mls/hr


Ondansetron HCl (Zofran Inj)  4 mg IVP Q4H PRN


   PRN Reason: Nausea/Vomiting


   Last Admin: 01/25/18 15:45 Dose:  4 mg


Pantoprazole Sodium (Protonix Inj)  40 mg IVP DAILY [facility]


   Last Admin: 01/26/18 09:43 Dose:  40 mg


Trazodone HCl (Desyrel)  100 mg PO HS [facility]


   Last Admin: 01/26/18 21:32 Dose:  100 mg











- Labs


Labs: 


 





 01/26/18 08:03 





 01/26/18 08:03 





 











PT  13.1 SECONDS (9.7-12.2)  H  01/22/18  15:54    


 


INR  1.2   01/22/18  15:54    


 


APTT  32 SECONDS (21-34)   01/22/18  15:54    














- Constitutional


Appears: Non-toxic, No Acute Distress





- ENT Exam


ENT Exam: Mucous Membranes Moist





- Respiratory Exam


Respiratory Exam: absent: Accessory Muscle Use, Respiratory Distress





- Cardiovascular Exam


Cardiovascular Exam: REGULAR RHYTHM.  absent: Tachycardia





- GI/Abdominal Exam


GI & Abdominal Exam: Soft, Tenderness (incisions c/d/i).  absent: Distended, 

Firm, Guarding, Rigid





- Neurological Exam


Neurological Exam: Alert, Awake, Oriented x3





Assessment and Plan





- Assessment and Plan (Free Text)


Assessment: 





52F POD#4 s/p lap nova


Plan: 





clear for d/c from surgery


clear to shower 


no heavy lifting 4-6 weeks


f/u in office with Dr Cristina next week





will d/w Dr Jonnie Samuel, PGY3

## 2018-01-27 NOTE — CP.PCM.PN
Subjective





- Date & Time of Evaluation


Date of Evaluation: 01/27/18


Time of Evaluation: 07:47





- Subjective


Subjective: 





PGY-2 note for Dr. Topete's service:





Pt seen and examined at bedside. Nursing reports no acute events overnight. 

Patient reports abdominal pain improved today. She reports passing flatus and 

having BM this morning. She denies fevers/chills, episodes of nausea/vomiting, 

sob, chest pains. She is asking when she can go home.





Objective





- Vital Signs/Intake and Output


Vital Signs (last 24 hours): 


 











Temp Pulse Resp BP Pulse Ox


 


 98.4 F   102 H  20   124/79   95 


 


 01/27/18 00:00  01/27/18 00:00  01/27/18 00:00  01/27/18 00:00  01/27/18 00:00








Intake and Output: 


 











 01/27/18 01/27/18





 06:59 18:59


 


Intake Total 2410 


 


Output Total 515 


 


Balance 1895 














- Medications


Medications: 


 Current Medications





Acetaminophen (Tylenol 325mg Tab)  650 mg PO Q6 PRN


   PRN Reason: Headache


   Last Admin: 01/26/18 20:01 Dose:  650 mg


Heparin Sodium (Porcine) (Heparin)  5,000 units SC Q8 [facility]


   Last Admin: 01/27/18 05:02 Dose:  5,000 units


Hydromorphone HCl (Dilaudid)  1 mg IVP Q3H PRN


   PRN Reason: Pain, severe (8-10)


   Last Admin: 01/27/18 05:00 Dose:  1 mg


Lactated Ringer's (Lactated Ringer's)  1,000 mls @ 120 mls/hr IV .Q8H20M [facility]


   Last Admin: 01/27/18 05:05 Dose:  120 mls/hr


Ceftriaxone Sodium 1 gm/ (Sodium Chloride)  100 mls @ 100 mls/hr IVPB Q12H [facility]


   Last Admin: 01/27/18 04:35 Dose:  100 mls/hr


Metronidazole (Flagyl)  500 mg in 100 mls @ 100 mls/hr IVPB Q8H [facility]


   Last Admin: 01/27/18 01:33 Dose:  100 mls/hr


Ondansetron HCl (Zofran Inj)  4 mg IVP Q4H [facility]


   PRN Reason: Nausea/Vomiting


   Last Admin: 01/27/18 02:51 Dose:  4 mg


Pantoprazole Sodium (Protonix Inj)  40 mg IVP DAILY [facility]


   Last Admin: 01/26/18 09:43 Dose:  40 mg


Trazodone HCl (Desyrel)  100 mg PO HS [facility]


   Last Admin: 01/26/18 21:32 Dose:  100 mg











- Labs


Labs: 


 





 01/27/18 06:22 





 01/27/18 06:22 





 











PT  13.1 SECONDS (9.7-12.2)  H  01/22/18  15:54    


 


INR  1.2   01/22/18  15:54    


 


APTT  32 SECONDS (21-34)   01/22/18  15:54    














- Additional Findings


Additional findings: 





- Constitutional


Appears: No Acute Distress





- Head Exam


Head Exam: ATRAUMATIC, NORMOCEPHALIC





- Eye Exam


Eye Exam: EOMI, Normal appearance, PERRL





- ENT Exam


ENT Exam: Mucous Membranes Moist





- Respiratory Exam


Respiratory Exam: Clear to Ausculation Bilateral, NORMAL BREATHING PATTERN.  

absent: Rales, Rhonchi, Wheezes, Stridor





- Cardiovascular Exam


Cardiovascular Exam: REGULAR RHYTHM, +S1, +S2





- GI/Abdominal Exam


GI & Abdominal Exam: Soft, Tenderness (only surgical site), Normal Bowel 

Sounds.  absent: Distended


KAREN drain - minimal drainage





- Extremities Exam


Extremities Exam: Normal Inspection.  absent: Calf Tenderness, Tenderness





- Neurological Exam


Neurological Exam: Alert, Awake, Oriented x3





- Psychiatric Exam


Psychiatric exam: Normal Affect, Normal Mood





- Skin


Skin Exam: Dry, Intact, Normal Color, Warm








Assessment and Plan





- Assessment and Plan (Free Text)


Plan: 





1. Cholecystitis


* Admit to med/surg


* Continue to monitor CBC


* Gen surgery consult, Dr. Cristina


 * s/p lap cholecystectomy on 1/23/18 with Dr. Cristina


* US ABD (1/22/18): Immobile gallstone at GB neck. Positive sono rain sign. 

No GB wall thickening or pericholecystic edema. (see full report)


* From prior admission:


 * CT A/P (1/16/18) Distended gallbladder. Common bile duct prominence 

pancreatic head. Evaluation regarding any potential pancreatic masses -no gross 

appreciated. No gross calculi in common bile duct appreciated. Follow-up 

recommended -initial right upper quadrant ultrasound attention to gallbladder 

common bile duct and pancreatic head recommended. Additional CT pancreatic 

protocol subsequent imaging -to be determined





Medications:


* Tylenol 975mg PO Q6H PRN (fever)


* Dilaudid 0.5mg IV Q4H PRN (moderate pain)


* LR @ 120mls/hr


* Flagyl 500mg IV Q8H IV (Start 1/22/18)


* Ceftriaxone 1gm Q12H IV (Start 1/22/18)


* Zofran 4mg IV Q4H PRN





Pre-op labs:


* Coags WNL


* EKG:nsr@77


* Chest x-ray- no active pulmonary disease





2. Abnormal UA


* UA (1/22/18): Positive nitrate, many bacteria, Sq epithelial


* Pt on ceftriaxone for cholecystitis


* urine culture: no growth





3. Thrombocytosis


* Resolved, 383 on 1/24/18


* Mild 534 on presentation, believed reactionary to infection


* Continue to Monitor





4. Depression


* Continue home Trazadone





5. Anxiety


* Continue Home Buspar





6. Degenerative disc disease/Scoliosis


* Pt on dilaudid for abd pain


* Monitor





7. Prophylaxis


* Hepain 5000sc


* SCDs


* Protonix 40mg IV daily


* PT/OT


* Dietician referral


* Encouraged OOB, ambulation, and IS





Disposition: Marked for discharge with surgical approval.





Discussed with Dr. Topete.

## 2018-01-27 NOTE — CP.PCM.DIS
Provider





- Provider


Date of Admission: 


01/22/18 17:28





Attending physician: 


Tolu Topete Jr, MD





Primary care physician: 





Efrem


Consults: 





Surgery: Jonnie


Time Spent in preparation of Discharge (in minutes): 35





Diagnosis





- Discharge Diagnosis


(1) Cholecystitis


Status: Acute   


Comment: see hospital course   





(2) UTI (urinary tract infection)


Status: Acute   


Comment: Ceftriaxone over course. Symptoms resolved.   





Hospital Course





- Lab Results


Lab Results: 


 Micro Results





01/23/18 Unknown   Urine,Clean Catch   Urine Culture - Final


                                No Growth (<1,000 CFU/ML)





 Most Recent Lab Values











WBC  11.4 K/uL (4.8-10.8)  H  01/27/18  06:22    


 


RBC  3.35 Mil/uL (3.80-5.20)  L  01/27/18  06:22    


 


Hgb  9.0 g/dL (11.0-16.0)  L  01/27/18  06:22    


 


Hct  26.8 % (34.0-47.0)  L  01/27/18  06:22    


 


MCV  80.0 fL (81.0-99.0)  L  01/27/18  06:22    


 


MCH  27.0 pg (27.0-31.0)   01/27/18  06:22    


 


MCHC  33.8 g/dL (33.0-37.0)   01/27/18  06:22    


 


RDW  12.9 % (11.5-14.5)   01/27/18  06:22    


 


Plt Count  407 K/uL (130-400)  H  01/27/18  06:22    


 


MPV  7.4 fL (7.2-11.7)   01/27/18  06:22    


 


Neut % (Auto)  74.2 % (50.0-75.0)   01/27/18  06:22    


 


Lymph % (Auto)  17.9 % (20.0-40.0)  L  01/27/18  06:22    


 


Mono % (Auto)  6.6 % (0.0-10.0)   01/27/18  06:22    


 


Eos % (Auto)  0.6 % (0.0-4.0)   01/27/18  06:22    


 


Baso % (Auto)  0.7 % (0.0-2.0)   01/27/18  06:22    


 


Neut #  8.5 K/uL (1.8-7.0)  H  01/27/18  06:22    


 


Lymph #  2.0 K/uL (1.0-4.3)   01/27/18  06:22    


 


Mono #  0.8 K/uL (0.0-0.8)   01/27/18  06:22    


 


Eos #  0.1 K/uL (0.0-0.7)   01/27/18  06:22    


 


Baso #  0.1 K/uL (0.0-0.2)   01/27/18  06:22    


 


PT  13.1 SECONDS (9.7-12.2)  H  01/22/18  15:54    


 


INR  1.2   01/22/18  15:54    


 


APTT  32 SECONDS (21-34)   01/22/18  15:54    


 


Sodium  135 mmol/L (132-148)   01/27/18  06:22    


 


Potassium  3.6 mmol/L (3.6-5.2)   01/27/18  06:22    


 


Chloride  101 mmol/L ()   01/27/18  06:22    


 


Carbon Dioxide  27 mmol/L (22-30)   01/27/18  06:22    


 


Anion Gap  10  (10-20)   01/27/18  06:22    


 


BUN  4 mg/dL (7-17)  L  01/27/18  06:22    


 


Creatinine  0.5 mg/dL (0.7-1.2)  L  01/27/18  06:22    


 


Est GFR ( Amer)  > 60   01/27/18  06:22    


 


Est GFR (Non-Af Amer)  > 60   01/27/18  06:22    


 


Random Glucose  117 mg/dL ()  H  01/27/18  06:22    


 


Calcium  8.2 mg/dl (8.6-10.4)  L  01/27/18  06:22    


 


Phosphorus  4.1 mg/dL (2.5-4.5)   01/23/18  06:34    


 


Magnesium  1.7 mg/dL (1.6-2.3)   01/23/18  06:34    


 


Total Bilirubin  0.2 mg/dL (0.2-1.3)   01/27/18  06:22    


 


Direct Bilirubin  0.3 mg/dL (0.0-0.4)   01/22/18  15:54    


 


AST  30 U/L (14-36)   01/27/18  06:22    


 


ALT  32 U/L (9-52)   01/27/18  06:22    


 


Alkaline Phosphatase  47 U/L ()   01/27/18  06:22    


 


Total Protein  5.9 g/dL (6.3-8.3)  L  01/27/18  06:22    


 


Albumin  2.8 g/dL (3.5-5.0)  L  01/27/18  06:22    


 


Globulin  3.1 gm/dL (2.2-3.9)   01/27/18  06:22    


 


Albumin/Globulin Ratio  0.9  (1.0-2.1)  L  01/27/18  06:22    


 


Lipase  110 U/L ()   01/22/18  15:54    


 


Urine Color  Yellow  (YELLOW)   01/22/18  15:54    


 


Urine Clarity  Hazy  (Clear)   01/22/18  15:54    


 


Urine pH  8.0  (5.0-8.0)   01/22/18  15:54    


 


Ur Specific Gravity  1.014  (1.003-1.030)   01/22/18  15:54    


 


Urine Protein  Negative mg/dL (NEGATIVE)   01/22/18  15:54    


 


Urine Glucose (UA)  Normal mg/dL (Normal)   01/22/18  15:54    


 


Urine Ketones  Trace mg/dL (NEGATIVE)   01/22/18  15:54    


 


Urine Blood  Negative  (NEGATIVE)   01/22/18  15:54    


 


Urine Nitrate  Positive  (NEGATIVE)  H  01/22/18  15:54    


 


Urine Bilirubin  Negative  (NEGATIVE)   01/22/18  15:54    


 


Urine Urobilinogen  Normal mg/dL (0.2-1.0)   01/22/18  15:54    


 


Ur Leukocyte Esterase  Neg Elaine/uL (Negative)   01/22/18  15:54    


 


Urine WBC (Auto)  1 /hpf (0-5)   01/22/18  15:54    


 


Urine RBC (Auto)  3 /hpf (0-3)   01/22/18  15:54    


 


Ur Squamous Epith Cells  10 /hpf (0-5)  H  01/22/18  15:54    


 


Urine Bacteria  Many  (<OCC)  H  01/22/18  15:54    


 


Urine HCG, Qual  Negative  (NEGATIVE)   01/23/18  04:40    














- Hospital Course


Hospital Course: 





On admission:


Patient is a 52 year old  female, with PMHx of cholelithiasis, anxiety, 

depression, DDD, and scoliosis, who presents to [facility] ED c/o RUQ pain. Patient 

states that pain began one week ago, starting in her epigastric area than 

spreading to her RUQ. At that time, she vomited yellow-green fluid "three to 

four times with no blood." When the pain did not cease she came to [facility] ED on 

1/16/18. Pt reports having CT which showed "distended gallbladder" and was 

diagnosed with constipation, given morphine and stool softeners, and 

discharged. Since discharge, pt has had worsening non-radiating RUQ pain, "10/10

" in severity, that skchante describes as a "twisting sensation" that "comes and 

goes." Pain is made worse with eating, especially fatty foods, but now all 

foods are intolerable. Patient denies any sick contacts. Patient denies having 

any fever, nausea, and vomiting.





Hospital course:


Pt admitted on 1.22.18. US/CT showing distended gallbaladder, with immobile 

gallstone at neck of GB. Surgical consult, with Dr George requested. Prior 

to surgery pre-op labs/imaging/EKG were checked and pt started on Ceftriaxone/

Flagyl IV antibiotics. Pt found to have UTI and was concomitantly on 

Ceftriaxone for cholecystitis. Cholecystectomy performed on 1/23/18 by Dr. Cristina. Over course pt was monitored post-surgery. Her pain decreased over 

course as did N/V. Ilan drain with decreased output. Pt discharged on 1/27/18 

with instructions to follow up with Dr. Topete, and Dr. Cristina for removal of 

drain. Pt discharged on short course of toradol for lingering pain.





Pts home meds were continued over course for Anxiety, depression.





- Date & Time of H&P


Date of H&P: 01/22/18


Time of H&P: 20:58





Discharge Exam





- Additional Findings


Additional findings: 








- Constitutional


Appears: No Acute Distress





- Head Exam


Head Exam: ATRAUMATIC, NORMOCEPHALIC





- Eye Exam


Eye Exam: EOMI, Normal appearance, PERRL





- ENT Exam


ENT Exam: Mucous Membranes Moist





- Respiratory Exam


Respiratory Exam: Clear to Ausculation Bilateral, NORMAL BREATHING PATTERN.  

absent: Rales, Rhonchi, Wheezes, Stridor





- Cardiovascular Exam


Cardiovascular Exam: REGULAR RHYTHM, +S1, +S2





- GI/Abdominal Exam


GI & Abdominal Exam: Soft, Tenderness (only surgical site), Normal Bowel 

Sounds.  absent: Distended


KAREN drain - minimal drainage





- Extremities Exam


Extremities Exam: Normal Inspection.  absent: Calf Tenderness, Tenderness





- Neurological Exam


Neurological Exam: Alert, Awake, Oriented x3





- Psychiatric Exam


Psychiatric exam: Normal Affect, Normal Mood





- Skin


Skin Exam: Dry, Intact, Normal Color, Warm





Discharge Plan





- Discharge Medications


Prescriptions: 


Ketorolac Tromethamine [Toradol] 10 mg PO Q6H PRN #10 tab


 PRN Reason: Pain, Moderate (4-7)





- Follow Up Plan


Condition: GOOD


Disposition: HOME/ ROUTINE


Patient education suggested?: Yes


Instructions:  Cholecystitis (DC), Cholecystitis (GEN), Constipation (DC), 

Constipation (GEN), Urinary Tract Infection in Women (DC), Urinary Tract 

Infection in Men (DC), Depression (DC), Dysuria (GEN)


Additional Instructions: 


Pt stable for discharge per Dr. Topete





Pt will be discharged with no prescriptions. She may resume her other home 

medications.





Pt should follow up with her PMD, Dr. Topete, within one week's time. She should 

also follow up in Dr. Cristina's office, her surgeon, within one week from 

discharge for drain removal. His office is listed below. She has been advised 

by surgical team to not lift heavy objects (over 10lbs) for the next 4-6 weeks.





She should return to the ED if symptoms return or worsen. Pt verbalized 

understanding to these instructions and agreed.





Prescribed medications:


Toradol





Dr. Cristina Office


[address] [facility] [address]


[phone]